# Patient Record
Sex: FEMALE | Race: WHITE | Employment: FULL TIME | URBAN - METROPOLITAN AREA
[De-identification: names, ages, dates, MRNs, and addresses within clinical notes are randomized per-mention and may not be internally consistent; named-entity substitution may affect disease eponyms.]

---

## 2021-06-09 ENCOUNTER — HOSPITAL ENCOUNTER (OUTPATIENT)
Age: 31
Discharge: HOME | End: 2021-06-11

## 2021-06-09 PROCEDURE — 88305 TISSUE EXAM BY PATHOLOGIST: CPT

## 2021-10-29 ENCOUNTER — OFFICE VISIT (OUTPATIENT)
Dept: FAMILY MEDICINE CLINIC | Age: 31
End: 2021-10-29
Payer: COMMERCIAL

## 2021-10-29 VITALS
SYSTOLIC BLOOD PRESSURE: 105 MMHG | HEIGHT: 63 IN | HEART RATE: 83 BPM | BODY MASS INDEX: 37.03 KG/M2 | WEIGHT: 209 LBS | TEMPERATURE: 97.9 F | RESPIRATION RATE: 18 BRPM | DIASTOLIC BLOOD PRESSURE: 64 MMHG | OXYGEN SATURATION: 99 %

## 2021-10-29 DIAGNOSIS — K62.5 RECTAL BLEEDING: ICD-10-CM

## 2021-10-29 DIAGNOSIS — R42 LIGHTHEADEDNESS: ICD-10-CM

## 2021-10-29 DIAGNOSIS — K62.5 RECTAL BLEEDING: Primary | ICD-10-CM

## 2021-10-29 LAB
BASOPHILS ABSOLUTE: 0.05 E9/L (ref 0–0.2)
BASOPHILS RELATIVE PERCENT: 0.8 % (ref 0–2)
EOSINOPHILS ABSOLUTE: 0.21 E9/L (ref 0.05–0.5)
EOSINOPHILS RELATIVE PERCENT: 3.2 % (ref 0–6)
HCT VFR BLD CALC: 43.4 % (ref 34–48)
HEMOGLOBIN: 14.1 G/DL (ref 11.5–15.5)
IMMATURE GRANULOCYTES #: 0.01 E9/L
IMMATURE GRANULOCYTES %: 0.2 % (ref 0–5)
LYMPHOCYTES ABSOLUTE: 1.33 E9/L (ref 1.5–4)
LYMPHOCYTES RELATIVE PERCENT: 20.2 % (ref 20–42)
MCH RBC QN AUTO: 31.2 PG (ref 26–35)
MCHC RBC AUTO-ENTMCNC: 32.5 % (ref 32–34.5)
MCV RBC AUTO: 96 FL (ref 80–99.9)
MONOCYTES ABSOLUTE: 0.56 E9/L (ref 0.1–0.95)
MONOCYTES RELATIVE PERCENT: 8.5 % (ref 2–12)
NEUTROPHILS ABSOLUTE: 4.43 E9/L (ref 1.8–7.3)
NEUTROPHILS RELATIVE PERCENT: 67.1 % (ref 43–80)
PDW BLD-RTO: 12.5 FL (ref 11.5–15)
PLATELET # BLD: 325 E9/L (ref 130–450)
PMV BLD AUTO: 9.6 FL (ref 7–12)
RBC # BLD: 4.52 E12/L (ref 3.5–5.5)
WBC # BLD: 6.6 E9/L (ref 4.5–11.5)

## 2021-10-29 PROCEDURE — G8419 CALC BMI OUT NRM PARAM NOF/U: HCPCS | Performed by: FAMILY MEDICINE

## 2021-10-29 PROCEDURE — G8484 FLU IMMUNIZE NO ADMIN: HCPCS | Performed by: FAMILY MEDICINE

## 2021-10-29 PROCEDURE — 36415 COLL VENOUS BLD VENIPUNCTURE: CPT | Performed by: FAMILY MEDICINE

## 2021-10-29 PROCEDURE — 99203 OFFICE O/P NEW LOW 30 MIN: CPT | Performed by: FAMILY MEDICINE

## 2021-10-29 PROCEDURE — 1036F TOBACCO NON-USER: CPT | Performed by: FAMILY MEDICINE

## 2021-10-29 PROCEDURE — G8427 DOCREV CUR MEDS BY ELIG CLIN: HCPCS | Performed by: FAMILY MEDICINE

## 2021-10-29 ASSESSMENT — ENCOUNTER SYMPTOMS
SORE THROAT: 0
ABDOMINAL PAIN: 1
CHEST TIGHTNESS: 0
BLOOD IN STOOL: 1
COUGH: 0
CONSTIPATION: 0
RECTAL PAIN: 1
SHORTNESS OF BREATH: 0
RHINORRHEA: 0
VOMITING: 0
DIARRHEA: 0
NAUSEA: 0

## 2021-10-29 ASSESSMENT — PATIENT HEALTH QUESTIONNAIRE - PHQ9
SUM OF ALL RESPONSES TO PHQ QUESTIONS 1-9: 0
2. FEELING DOWN, DEPRESSED OR HOPELESS: 0
1. LITTLE INTEREST OR PLEASURE IN DOING THINGS: 0
SUM OF ALL RESPONSES TO PHQ9 QUESTIONS 1 & 2: 0

## 2021-10-29 NOTE — PROGRESS NOTES
[facility]  Precepting Note    Subjective:  Rectal bleeding, bright red blood on Monday  Pain with defecation  No hx of hemorrhoids  1 episode in the past  Increased fibers  Has some lightheadedness and headaches  No constipation  5 weeks pregnancy    ROS otherwise negative     Past medical, surgical, family and social history were reviewed, non-contributory, and unchanged unless otherwise stated. Objective:    /64   Pulse 83   Temp 97.9 °F (36.6 °C) (Temporal)   Resp 18   Ht 5' 3\" (1.6 m)   Wt 209 lb (94.8 kg)   LMP 09/20/2021 (Exact Date)   SpO2 99%   BMI 37.02 kg/m²     Exam is as noted by resident     Assessment/Plan:  Rectal bleeding  CBC  Refer to general surgery for further eval  F/u as instructed     Attending Physician Statement  I have reviewed the chart, including any radiology or labs. I have discussed the case, including pertinent history and exam findings with the resident. I agree with the assessment, plan and orders as documented by the resident. Please refer to the resident note for additional information.       Electronically signed by Shanda Cruz MD on 10/29/2021 at 10:31 AM

## 2021-10-29 NOTE — PATIENT INSTRUCTIONS
Patient Education        Rectal Bleeding: Care Instructions  Your Care Instructions     Rectal bleeding in small amounts is common. You may see red spotting on toilet paper or drops of blood in the toilet. Rectal bleeding has many possible causes, from something as minor as hemorrhoids to something as serious as colon cancer. You may need more tests to find the cause of your bleeding. Follow-up care is a key part of your treatment and safety. Be sure to make and go to all appointments, and call your doctor if you are having problems. It's also a good idea to know your test results and keep a list of the medicines you take. How can you care for yourself at home? · Avoid aspirin and other nonsteroidal anti-inflammatory drugs (NSAIDs), such as ibuprofen (Advil, Motrin) and naproxen (Aleve). They can cause you to bleed more. Ask your doctor if you can take acetaminophen (Tylenol). Read and follow all instructions on the label. · Use a stool softener that contains bran or psyllium. You can save money by buying bran or psyllium (available in bulk at most health food stores) and sprinkling it on foods or stirring it into fruit juice. You can also use a product such as Metamucil or Citrucel. · Take your medicines exactly as directed. Call your doctor if you think you are having a problem with your medicine. When should you call for help? Call 911 anytime you think you may need emergency care. For example, call if:    · You passed out (lost consciousness). Call your doctor now or seek immediate medical care if:    · You have new or worse pain.     · You have new or worse bleeding from the rectum.     · You are dizzy or light-headed, or you feel like you may faint. Watch closely for changes in your health, and be sure to contact your doctor if:    · You cannot pass stools or gas.     · You do not get better as expected. Where can you learn more? Go to [email or web address] and sign in to your [employer] account. Enter L662 in the [facility] box to learn more about \"Rectal Bleeding: Care Instructions. \"     If you do not have an account, please click on the \"Sign Up Now\" link. Current as of: February 10, 2021               Content Version: 13.0  © 0126-1988 Healthwise, Incorporated. Care instructions adapted under license by [facility] ([facility]). If you have questions about a medical condition or this instruction, always ask your healthcare professional. [address] any warranty or liability for your use of this information.

## 2021-10-29 NOTE — PROGRESS NOTES
Financial Resource Strain:     Difficulty of Paying Living Expenses:    Food Insecurity:     Worried About Running Out of Food in the Last Year:     [address] in the Last Year:    Transportation Needs:     Lack of Transportation (Medical):  Lack of Transportation (Non-Medical):    Physical Activity:     Days of Exercise per Week:     Minutes of Exercise per Session:    Stress:     Feeling of Stress :    Social Connections:     Frequency of Communication with Friends and Family:     Frequency of Social Gatherings with Friends and Family:     Attends Caodaism Services:     Active Member of Clubs or Organizations:     Attends Club or Organization Meetings:     Marital Status:    Intimate Partner Violence:     Fear of Current or Ex-Partner:     Emotionally Abused:     Physically Abused:     Sexually Abused:         Family History:   No family history on file. Review of Systems:   Review of Systems   Constitutional: Negative for chills, fatigue and fever. HENT: Negative for congestion, rhinorrhea and sore throat. Respiratory: Negative for cough, chest tightness and shortness of breath. Cardiovascular: Negative for chest pain and palpitations. Gastrointestinal: Positive for abdominal pain (minimal but improving), blood in stool and rectal pain. Negative for constipation, diarrhea, nausea and vomiting. Genitourinary: Negative for difficulty urinating, dysuria and frequency. Neurological: Positive for light-headedness (with standing from a seated position) and headaches. Negative for dizziness and weakness. All other systems reviewed and are negative.       Physical Exam   Vitals: /64   Pulse 83   Temp 97.9 °F (36.6 °C) (Temporal)   Resp 18   Ht 5' 3\" (1.6 m)   Wt 209 lb (94.8 kg)   LMP 09/20/2021 (Exact Date)   SpO2 99%   BMI 37.02 kg/m²     BP Readings from Last 3 Encounters:   10/29/21 105/64       Physical Exam  Constitutional:       General: She is not in acute distress. Appearance: Normal appearance. HENT:      Head: Normocephalic and atraumatic. Mouth/Throat:      Mouth: Mucous membranes are moist.      Pharynx: Oropharynx is clear. Eyes:      Extraocular Movements: Extraocular movements intact. Conjunctiva/sclera: Conjunctivae normal.   Cardiovascular:      Rate and Rhythm: Normal rate and regular rhythm. Pulses: Normal pulses. Heart sounds: Normal heart sounds. No murmur heard. Pulmonary:      Effort: Pulmonary effort is normal.      Breath sounds: Normal breath sounds. No wheezing. Abdominal:      General: Bowel sounds are normal. There is no distension. Palpations: Abdomen is soft. Tenderness: There is no abdominal tenderness. Genitourinary:     Rectum: Normal.      Comments: No anal fissures or hemorrhoids appreciated on rectal exam.  Musculoskeletal:         General: No swelling. Cervical back: Normal range of motion. Skin:     General: Skin is warm and dry. Neurological:      General: No focal deficit present. Mental Status: She is alert and oriented to person, place, and time. Cranial Nerves: No cranial nerve deficit. Psychiatric:         Attention and Perception: Attention normal.         Mood and Affect: Mood normal.         Assessment and Plan       1. Rectal bleeding  - 1 week history of bleeding  - Had an episode of lightheadedness when standing, will get CBC  - No anal fissures or hemorrhoids present on PE  -We will refer to general surgery for possible scope  - Lazara Contreras MD, General Surgery, [place]  - CBC WITH AUTO DIFFERENTIAL; Future    2. Lightheadedness  -In the setting of bright red blood per rectum  -We will get blood count  - CBC WITH AUTO DIFFERENTIAL; Future      Counseled regarding above diagnosis, including possible risks and complications,  especially if left uncontrolled.  Counseled regarding the possible side effects, risks, benefits and alternatives to treatment; patient and/or guardian verbalizes understanding, agrees, feels comfortable with and wishes to proceed with above treatment plan. Call or go to ED immediately if symptoms worsen or persist. Advised patient to call with any new medication issues, and, as applicable, read all Rx info from pharmacy to assure aware of all possible risks and side effects of medication before taking. Patient and/or guardian given opportunity to ask questions/raise concerns. The patient verbalized comfort and understanding of instructions. I encourage further reading and education about your health conditions. Information on many health conditions is provided by the American Academy of Family Physicians: https://familydoctor. org/  Please bring any questions to me at your next visit. Return to Office: Return in about 3 weeks (around 11/19/2021). Medication List:    Current Outpatient Medications   Medication Sig Dispense Refill    Prenatal Vit-Fe Fumarate-FA (PRENATAL VITAMIN PO) Take by mouth       No current facility-administered medications for this visit. Isaias Marin, DO       This document may have been prepared at least partially through the use of voice recognition software. Although effort is taken to assure the accuracy of this document, it is possible that grammatical, syntax, or spelling errors may occur.

## 2021-11-03 ENCOUNTER — OFFICE VISIT (OUTPATIENT)
Dept: SURGERY | Age: 31
End: 2021-11-03
Payer: COMMERCIAL

## 2021-11-03 VITALS
HEIGHT: 63 IN | TEMPERATURE: 97.9 F | DIASTOLIC BLOOD PRESSURE: 73 MMHG | SYSTOLIC BLOOD PRESSURE: 130 MMHG | BODY MASS INDEX: 37.02 KG/M2 | HEART RATE: 74 BPM

## 2021-11-03 DIAGNOSIS — K62.5 RECTAL BLEEDING: Primary | ICD-10-CM

## 2021-11-03 PROCEDURE — G8427 DOCREV CUR MEDS BY ELIG CLIN: HCPCS | Performed by: SURGERY

## 2021-11-03 PROCEDURE — 99244 OFF/OP CNSLTJ NEW/EST MOD 40: CPT | Performed by: SURGERY

## 2021-11-03 PROCEDURE — G8484 FLU IMMUNIZE NO ADMIN: HCPCS | Performed by: SURGERY

## 2021-11-03 PROCEDURE — G8419 CALC BMI OUT NRM PARAM NOF/U: HCPCS | Performed by: SURGERY

## 2021-11-03 NOTE — PATIENT INSTRUCTIONS
TAMMY COLONOSCOPY PREPARATION    Instructions for Clear liquid diet  Definition  A clear liquid diet consists of clear liquids, such as water, broth and plain gelatin, that are easily digested and leave no undigested residue in your intestinal tract. Your doctor may prescribe a clear liquid diet before certain medical procedures or if you have certain digestive problems. Because a clear liquid diet can't provide you with adequate calories and nutrients, it shouldn't be continued for more than a few days. Purpose  A clear liquid diet is often used before tests, procedures or surgeries that require no food in your stomach or intestines, such as before colonoscopy. It may also be recommended as a short-term diet if you have certain digestive problems, such as nausea, vomiting or diarrhea, or after certain types of surgery. Diet details  A clear liquid diet helps maintain adequate hydration, provides some important electrolytes, such as sodium and potassium, and gives some energy at a time when a full diet isn't possible or recommended. The following foods are allowed in a clear liquid diet:    Plain water    Fruit juices without pulp, such as apple juice, white grape juice.  Strained lemonade    Clear, fat-free broth (bouillon or consomme)    Clear sodas     Plain gelatin (Not RED or PURPLE)   Honey    Ice pops without bits of fruit or fruit pulp    Tea or coffee without milk or cream   ** NOTHING RED OR PURPLE    Any foods not on the above list should be avoided. Also, for certain tests, such as colon exams, your doctor may ask you to avoid liquids or gelatin with red coloring.      A typical menu on the clear liquid diet may look like this:   Breakfast:  1 glass fruit juice  1 cup coffee or tea (without dairy products)  1 cup broth  1 bowl gelatin     Snack:  1 glass fruit juice  1 bowl gelatin     Lunch:  1 glass fruit juice  1 glass water  1 cup broth  1 bowl gelatin     Snack:  1 ice pop (without fruit pulp)  1 cup coffee or tea (without dairy products) or a soft drink     Dinner:  1 cup juice or water  1 cup broth  1 bowl gelatin  1 cup coffee or tea     Purchase this over the counter:  1. GATORADE/Clear liquid (64 ounces)   Pick these up at the pharmacy:   [address] [place] [address] 238 grams (over the counter only)    The DAY BEFORE your colonoscopy:   Drink only clear liquids. (Absolutely no solid food)    COLONOSCOPY PREP:  3 PM: Mix the entire bottle of MIRALAX into the 64 ounces of GATORADE. (Put half the bottle in each 32 ounce bottle). Shake the solution until fully dissolved. Drink an 8 ounce glass every 30 minutes until the solution is gone. **DO NOT EAT OR DRINK ANYTHING AFTER MIDNIGHT**          The DAY OF your colonoscopy: You may take any necessary medications with a sip of water. Bring along someone to take you home. REMEMBER: The preparation is very important. An adequate clean out allows for the best evaluation of your entire colon. During the prep, using baby wipes may ease some of your discomfort.     You should NOT plan on working or driving the rest of the day due to sedation given at the procedure

## 2021-11-04 ENCOUNTER — TELEPHONE (OUTPATIENT)
Dept: SURGERY | Age: 31
End: 2021-11-04

## 2021-11-04 NOTE — TELEPHONE ENCOUNTER
Prior Authorization Form:      DEMOGRAPHICS:                     Patient Name:  Reese Carmona  Patient :  1990            Insurance:  Payor: Shira Milan MIKKI / Plan: Marleny Montemayor / Product Type: *No Product type* /   Insurance ID Number:    Payor/Plan Subscr  Sex Relation Sub. Ins. ID Effective Group Num   1.  [id number] [employer]* 1990 Female Self [id number] 10/1/21 [id number]                                    [address] [id number]         DIAGNOSIS & PROCEDURE:                       Procedure/Operation: colonoscopy           CPT Code: 40675    Diagnosis:  Rectal bleeding    ICD10 Code: k62.5    Location:  [facility]    Surgeon:  Reese Dave INFORMATION:                          Date: 11/10/21    Time: 1000              Anesthesia:  MAC/TIVA                                                       Status:  Outpatient        Special Comments:         Electronically signed by Carroll Basilio MA on 2021 at 8:55 AM

## 2021-11-05 RX ORDER — SODIUM CHLORIDE 9 MG/ML
INJECTION, SOLUTION INTRAVENOUS CONTINUOUS
Status: CANCELLED | OUTPATIENT
Start: 2021-11-05

## 2021-11-05 ASSESSMENT — ENCOUNTER SYMPTOMS
EYE REDNESS: 0
VOMITING: 0
BLOOD IN STOOL: 0
ANAL BLEEDING: 1
BACK PAIN: 0
SHORTNESS OF BREATH: 0
ABDOMINAL PAIN: 0
NAUSEA: 0
SORE THROAT: 0
CONSTIPATION: 0
PHOTOPHOBIA: 0
DIARRHEA: 0
WHEEZING: 0
COUGH: 0

## 2021-11-05 NOTE — H&P
HISTORY OF PRESENT ILLNESS:    The patient is a 32 y.o. female who presents with complaints of rectal bleeding. Is been ongoing for the last couple of weeks. Of note, she is pregnant. She denies any colon cancer history in her family. She denies any diarrhea, constipation, or significant straining. Past Medical History:   Diagnosis Date    Missed         Past Surgical History:   Procedure Laterality Date    LAPAROSCOPY  2014    WISDOM TOOTH EXTRACTION         Prior to Admission medications    Medication Sig Start Date End Date Taking? Authorizing Provider   Prenatal Vit-Fe Fumarate-FA (PRENATAL VITAMIN PO) Take by mouth   Yes Historical Provider, MD       Scheduled Meds:  ContinuousInfusions:  PRN Meds:    Allergies   Allergen Reactions    Latex Hives and Itching       Social History     Socioeconomic History    Marital status: Unknown     Spouse name: Not on file    Number of children: Not on file    Years of education: Not on file    Highest education level: Not on file   Occupational History    Not on file   Tobacco Use    Smoking status: Never Smoker    Smokeless tobacco: Never Used   Vaping Use    Vaping Use: Some days   Substance and Sexual Activity    Alcohol use: Not Currently    Drug use: Never    Sexual activity: Not on file   Other Topics Concern    Not on file   Social History Narrative    Not on file     Social Determinants of Health     Financial Resource Strain:     Difficulty of Paying Living Expenses:    Food Insecurity:     Worried About Running Out of Food in the Last Year:     [address] in the Last Year:    Transportation Needs:     Lack of Transportation (Medical):      Lack of Transportation (Non-Medical):    Physical Activity:     Days of Exercise per Week:     Minutes of Exercise per Session:    Stress:     Feeling of Stress :    Social Connections:     Frequency of Communication with Friends and Family:     Frequency of Social Gatherings with Friends and Family:     Attends Religion Services:     Active Member of Clubs or Organizations:     Attends Club or Organization Meetings:     Marital Status:    Intimate Partner Violence:     Fear of Current or Ex-Partner:     Emotionally Abused:     Physically Abused:     Sexually Abused:        No family history on file. Review Of Systems:   Review of Systems   Constitutional: Negative for chills and fever. HENT: Negative for ear pain, nosebleeds, sore throat and tinnitus. Eyes: Negative for photophobia and redness. Respiratory: Negative for cough, shortness of breath and wheezing. Cardiovascular: Negative for chest pain and palpitations. Gastrointestinal: Positive for anal bleeding. Negative for abdominal pain, blood in stool, constipation, diarrhea, nausea and vomiting. Endocrine: Negative for polydipsia. Genitourinary: Negative for dysuria, hematuria and urgency. Musculoskeletal: Negative for back pain and neck pain. Skin: Negative for rash. Neurological: Negative for dizziness, tremors and seizures. Hematological: Does not bruise/bleed easily. All other systems reviewed and are negative.        Physical Exam:  Vitals:    11/03/21 1432   BP: 130/73   Pulse: 74   Temp: 97.9 °F (36.6 °C)   Height: 5' 3\" (1.6 m)       General: Well nourished, well developed, no acute distress  Eyes:  PERRL   Conjunctiva unremarkable   ENT:  TM's intact bilaterally, no effusion   Nasal:  No mucosal edema     No nasal drainage   Oral:  mucosa moist and pink   Neck:  Supple   No palpable cervical lymphoadenopathy   Thyroid without mass or enlargement  Resp: Lungs CTAB   Equal and adequate air exchange without accessory muscle use   No rales, rhonchi or wheeze  CV: S1S2 RRR   No murmur   Intact distal pulses   No edema  GI: Abdomen Soft, non tender, non distended   Normoactive bowel sounds   No palpable hepatosplenomegaly  MS: Physiologic ROM of all extremities    Intact distal pulses   No

## 2021-11-05 NOTE — PROGRESS NOTES
Consult Note    Dear Elliot Duffy DO, thank you for referring Ross Fallon for evaluation. Reason for Consult: Rectal bleeding    HISTORY OF PRESENT ILLNESS:    The patient is a 32 y.o. female who presents with complaints of rectal bleeding. Is been ongoing for the last couple of weeks. Of note, she is pregnant. She denies any colon cancer history in her family. She denies any diarrhea, constipation, or significant straining. Past Medical History:   Diagnosis Date    Missed         Past Surgical History:   Procedure Laterality Date    LAPAROSCOPY  2014    WISDOM TOOTH EXTRACTION         Prior to Admission medications    Medication Sig Start Date End Date Taking? Authorizing Provider   Prenatal Vit-Fe Fumarate-FA (PRENATAL VITAMIN PO) Take by mouth   Yes Historical Provider, MD       Scheduled Meds:  ContinuousInfusions:  PRN Meds:    Allergies   Allergen Reactions    Latex Hives and Itching       Social History     Socioeconomic History    Marital status: Unknown     Spouse name: Not on file    Number of children: Not on file    Years of education: Not on file    Highest education level: Not on file   Occupational History    Not on file   Tobacco Use    Smoking status: Never Smoker    Smokeless tobacco: Never Used   Vaping Use    Vaping Use: Some days   Substance and Sexual Activity    Alcohol use: Not Currently    Drug use: Never    Sexual activity: Not on file   Other Topics Concern    Not on file   Social History Narrative    Not on file     Social Determinants of Health     Financial Resource Strain:     Difficulty of Paying Living Expenses:    Food Insecurity:     Worried About Running Out of Food in the Last Year:     [address] in the Last Year:    Transportation Needs:     Lack of Transportation (Medical):      Lack of Transportation (Non-Medical):    Physical Activity:     Days of Exercise per Week:     Minutes of Exercise per Session:    Stress:  Feeling of Stress :    Social Connections:     Frequency of Communication with Friends and Family:     Frequency of Social Gatherings with Friends and Family:     Attends Cheondoism Services:     Active Member of Clubs or Organizations:     Attends Club or Organization Meetings:     Marital Status:    Intimate Partner Violence:     Fear of Current or Ex-Partner:     Emotionally Abused:     Physically Abused:     Sexually Abused:        No family history on file. Review Of Systems:   Review of Systems   Constitutional: Negative for chills and fever. HENT: Negative for ear pain, nosebleeds, sore throat and tinnitus. Eyes: Negative for photophobia and redness. Respiratory: Negative for cough, shortness of breath and wheezing. Cardiovascular: Negative for chest pain and palpitations. Gastrointestinal: Positive for anal bleeding. Negative for abdominal pain, blood in stool, constipation, diarrhea, nausea and vomiting. Endocrine: Negative for polydipsia. Genitourinary: Negative for dysuria, hematuria and urgency. Musculoskeletal: Negative for back pain and neck pain. Skin: Negative for rash. Neurological: Negative for dizziness, tremors and seizures. Hematological: Does not bruise/bleed easily. All other systems reviewed and are negative.        Physical Exam:  Vitals:    11/03/21 1432   BP: 130/73   Pulse: 74   Temp: 97.9 °F (36.6 °C)   Height: 5' 3\" (1.6 m)       General: Well nourished, well developed, no acute distress  Eyes:  PERRL   Conjunctiva unremarkable   ENT:  TM's intact bilaterally, no effusion   Nasal:  No mucosal edema     No nasal drainage   Oral:  mucosa moist and pink   Neck:  Supple   No palpable cervical lymphoadenopathy   Thyroid without mass or enlargement  Resp: Lungs CTAB   Equal and adequate air exchange without accessory muscle use   No rales, rhonchi or wheeze  CV: S1S2 RRR   No murmur   Intact distal pulses   No edema  GI: Abdomen Soft, non tender, non

## 2021-12-02 ENCOUNTER — OFFICE VISIT (OUTPATIENT)
Dept: FAMILY MEDICINE CLINIC | Age: 31
End: 2021-12-02
Payer: COMMERCIAL

## 2021-12-02 VITALS
TEMPERATURE: 98.1 F | HEIGHT: 63 IN | DIASTOLIC BLOOD PRESSURE: 62 MMHG | WEIGHT: 208 LBS | SYSTOLIC BLOOD PRESSURE: 104 MMHG | HEART RATE: 83 BPM | RESPIRATION RATE: 18 BRPM | OXYGEN SATURATION: 99 % | BODY MASS INDEX: 36.86 KG/M2

## 2021-12-02 DIAGNOSIS — K62.5 RECTAL BLEEDING: Primary | ICD-10-CM

## 2021-12-02 DIAGNOSIS — Z23 NEEDS FLU SHOT: ICD-10-CM

## 2021-12-02 PROCEDURE — G8427 DOCREV CUR MEDS BY ELIG CLIN: HCPCS | Performed by: FAMILY MEDICINE

## 2021-12-02 PROCEDURE — G8419 CALC BMI OUT NRM PARAM NOF/U: HCPCS | Performed by: FAMILY MEDICINE

## 2021-12-02 PROCEDURE — 1036F TOBACCO NON-USER: CPT | Performed by: FAMILY MEDICINE

## 2021-12-02 PROCEDURE — 99213 OFFICE O/P EST LOW 20 MIN: CPT | Performed by: FAMILY MEDICINE

## 2021-12-02 PROCEDURE — G8482 FLU IMMUNIZE ORDER/ADMIN: HCPCS | Performed by: FAMILY MEDICINE

## 2021-12-02 PROCEDURE — 90674 CCIIV4 VAC NO PRSV 0.5 ML IM: CPT | Performed by: FAMILY MEDICINE

## 2021-12-02 PROCEDURE — 90471 IMMUNIZATION ADMIN: CPT | Performed by: FAMILY MEDICINE

## 2021-12-02 RX ORDER — DOCUSATE SODIUM 100 MG/1
100 CAPSULE, LIQUID FILLED ORAL DAILY
COMMUNITY

## 2021-12-02 SDOH — ECONOMIC STABILITY: FOOD INSECURITY: WITHIN THE PAST 12 MONTHS, YOU WORRIED THAT YOUR FOOD WOULD RUN OUT BEFORE YOU GOT MONEY TO BUY MORE.: NEVER TRUE

## 2021-12-02 SDOH — ECONOMIC STABILITY: FOOD INSECURITY: WITHIN THE PAST 12 MONTHS, THE FOOD YOU BOUGHT JUST DIDN'T LAST AND YOU DIDN'T HAVE MONEY TO GET MORE.: NEVER TRUE

## 2021-12-02 ASSESSMENT — ENCOUNTER SYMPTOMS
SHORTNESS OF BREATH: 0
CONSTIPATION: 0
COUGH: 0
CHEST TIGHTNESS: 0
DIARRHEA: 0
ABDOMINAL PAIN: 0
RHINORRHEA: 0
SORE THROAT: 0
VOMITING: 0
NAUSEA: 0

## 2021-12-02 ASSESSMENT — SOCIAL DETERMINANTS OF HEALTH (SDOH): HOW HARD IS IT FOR YOU TO PAY FOR THE VERY BASICS LIKE FOOD, HOUSING, MEDICAL CARE, AND HEATING?: NOT HARD AT ALL

## 2021-12-02 NOTE — PROGRESS NOTES
[facility]  Department of Family Medicine  Family Medicine Residency Program      Patient:  Juvencio Chaudhry 32 y.o. female     Date of Service: 21      Chief complaint:   Chief Complaint   Patient presents with    Rectal Bleeding     no longer present; cancelled colonoscopy     Established New Doctor         History of Present Illness   The patient is a 32 y.o. female  presented to the clinic with complaints as above. Rectal bleeding - She has had no rectal bleeding since October. Was scheduled for scope but OB advised against it. Started on Colace which has helped with constipation. She has no rectal or abdominal pain. 11 weeks pregnant -  follows with Dr. Ethel Jason, Scheduled to see her next Tuesday. Has been doing well.     Flu shot - Is agreeable to get shot today    Past Medical History:      Diagnosis Date    Missed      Pregnancy     21-7 weeks    Rectal bleed        Past Surgical History:        Procedure Laterality Date    DILATION AND CURETTAGE OF UTERUS  2021    LAPAROSCOPY  2014    WISDOM TOOTH EXTRACTION         Allergies:    Latex    Social History:   Social History     Socioeconomic History    Marital status: Unknown     Spouse name: Not on file    Number of children: Not on file    Years of education: Not on file    Highest education level: Not on file   Occupational History    Not on file   Tobacco Use    Smoking status: Never Smoker    Smokeless tobacco: Never Used   Vaping Use    Vaping Use: Never used   Substance and Sexual Activity    Alcohol use: Not Currently    Drug use: Never    Sexual activity: Not on file   Other Topics Concern    Not on file   Social History Narrative    Not on file     Social Determinants of Health     Financial Resource Strain: Low Risk     Difficulty of Paying Living Expenses: Not hard at all   Food Insecurity: No Food Insecurity    Worried About [address] in the Last Year: Never true    Ulisses of Food in the Last Year: Never true   Transportation Needs:     Lack of Transportation (Medical): Not on file    Lack of Transportation (Non-Medical): Not on file   Physical Activity:     Days of Exercise per Week: Not on file    Minutes of Exercise per Session: Not on file   Stress:     Feeling of Stress : Not on file   Social Connections:     Frequency of Communication with Friends and Family: Not on file    Frequency of Social Gatherings with Friends and Family: Not on file    Attends Restoration Services: Not on file    Active Member of [address] [place] [address] [employer] or Organizations: Not on file    Attends Club or Organization Meetings: Not on file    Marital Status: Not on file   Intimate Partner Violence:     Fear of Current or Ex-Partner: Not on file    Emotionally Abused: Not on file    Physically Abused: Not on file    Sexually Abused: Not on file   Housing Stability:     Unable to Pay for Housing in the Last Year: Not on file    Number of [place] in the Last Year: Not on file    Unstable Housing in the Last Year: Not on file        Family History:       Problem Relation Age of Onset    Other Cancer Mother        Review of Systems:   Review of Systems   Constitutional: Negative for chills, fatigue and fever. HENT: Negative for congestion, rhinorrhea and sore throat. Respiratory: Negative for cough, chest tightness and shortness of breath. Cardiovascular: Negative for chest pain and palpitations. Gastrointestinal: Negative for abdominal pain, constipation, diarrhea, nausea and vomiting. Genitourinary: Negative for dysuria and frequency. Neurological: Negative for dizziness and light-headedness. All other systems reviewed and are negative.       Physical Exam   Vitals: /62   Pulse 83   Temp 98.1 °F (36.7 °C)   Resp 18   Ht 5' 3\" (1.6 m)   Wt 208 lb (94.3 kg)   LMP 09/20/2021 (Exact Date)   SpO2 99%   BMI 36.85 kg/m²     BP Readings from Last 3 Encounters: 12/02/21 104/62   11/03/21 130/73   10/29/21 105/64       Physical Exam  Constitutional:       General: She is not in acute distress. Appearance: Normal appearance. HENT:      Head: Normocephalic and atraumatic. Mouth/Throat:      Mouth: Mucous membranes are moist.      Pharynx: Oropharynx is clear. Eyes:      Extraocular Movements: Extraocular movements intact. Conjunctiva/sclera: Conjunctivae normal.   Cardiovascular:      Rate and Rhythm: Normal rate and regular rhythm. Pulses: Normal pulses. Heart sounds: Normal heart sounds. No murmur heard. Pulmonary:      Effort: Pulmonary effort is normal.      Breath sounds: Normal breath sounds. No wheezing. Abdominal:      General: Bowel sounds are normal. There is no distension. Palpations: Abdomen is soft. Tenderness: There is no abdominal tenderness. Musculoskeletal:         General: No swelling. Cervical back: Normal range of motion. Skin:     General: Skin is warm and dry. Neurological:      General: No focal deficit present. Mental Status: She is alert and oriented to person, place, and time. Cranial Nerves: No cranial nerve deficit. Psychiatric:         Attention and Perception: Attention normal.         Mood and Affect: Mood normal.             Assessment and Plan       1. Rectal bleeding  - Improved, none since October  - Cancelled scope due to pregnancy  - Continue to monitor  - Normal CBC at next appointment    2. Needs flu shot  - INFLUENZA, MDCK QUADV, 2 YRS AND OLDER, IM, PF, PREFILL SYR OR SDV, 0.5ML (FLUCELVAX QUADV, PF)      Counseled regarding above diagnosis, including possible risks and complications,  especially if left uncontrolled. Counseled regarding the possible side effects, risks, benefits and alternatives to treatment; patient and/or guardian verbalizes understanding, agrees, feels comfortable with and wishes to proceed with above treatment plan.     Call or go to ED immediately if symptoms worsen or persist. Advised patient to call with any new medication issues, and, as applicable, read all Rx info from pharmacy to assure aware of all possible risks and side effects of medication before taking. Patient and/or guardian given opportunity to ask questions/raise concerns. The patient verbalized comfort and understanding of instructions. I encourage further reading and education about your health conditions. Information on many health conditions is provided by the American Academy of [employer]icians: https://familydoctor. org/  Please bring any questions to me at your next visit. Return to Office: Return in about 9 months (around 9/2/2022), or if symptoms worsen or fail to improve, for Yearly Physical.    Medication List:    Current Outpatient Medications   Medication Sig Dispense Refill    docusate sodium (COLACE) 100 MG capsule Take 100 mg by mouth daily      Prenatal Vit-Fe Fumarate-FA (PRENATAL VITAMIN PO) Take by mouth       No current facility-administered medications for this visit. Bull Phelps, DO       This document may have been prepared at least partially through the use of voice recognition software. Although effort is taken to assure the accuracy of this document, it is possible that grammatical, syntax, or spelling errors may occur.

## 2021-12-02 NOTE — PROGRESS NOTES
[facility]  Precepting Note    Subjective:  Rectal bleeding  Cbc was stable  Was referred to surgery, colonoscopy is on hold as she is pregnant  Bleeding stopped. ROS otherwise negative     Past medical, surgical, family and social history were reviewed, non-contributory, and unchanged unless otherwise stated. Objective:    /62   Pulse 83   Temp 98.1 °F (36.7 °C)   Resp 18   Ht 5' 3\" (1.6 m)   Wt 208 lb (94.3 kg)   LMP 09/20/2021 (Exact Date)   SpO2 99%   BMI 36.85 kg/m²     Exam is as noted by resident     Assessment/Plan:  Rectal bleeding- resolved  Continue colace  Observe  Follow with OB for prenatal care  Update HM  F/u as instructed     Attending Physician Statement  I have reviewed the chart, including any radiology or labs. I have discussed the case, including pertinent history and exam findings with the resident. I agree with the assessment, plan and orders as documented by the resident. Please refer to the resident note for additional information.       Electronically signed by Kylie De La Rosa MD on 12/2/2021 at 9:10 AM

## 2022-02-17 ENCOUNTER — APPOINTMENT (OUTPATIENT)
Dept: GENERAL RADIOLOGY | Age: 32
End: 2022-02-17
Payer: COMMERCIAL

## 2022-02-17 ENCOUNTER — NURSE TRIAGE (OUTPATIENT)
Dept: OTHER | Facility: CLINIC | Age: 32
End: 2022-02-17

## 2022-02-17 ENCOUNTER — HOSPITAL ENCOUNTER (EMERGENCY)
Age: 32
Discharge: HOME | End: 2022-02-17
Attending: STUDENT IN AN ORGANIZED HEALTH CARE EDUCATION/TRAINING PROGRAM
Payer: COMMERCIAL

## 2022-02-17 VITALS
HEART RATE: 86 BPM | HEIGHT: 62 IN | BODY MASS INDEX: 41.04 KG/M2 | TEMPERATURE: 98.5 F | DIASTOLIC BLOOD PRESSURE: 56 MMHG | WEIGHT: 223 LBS | SYSTOLIC BLOOD PRESSURE: 107 MMHG | RESPIRATION RATE: 16 BRPM | OXYGEN SATURATION: 98 %

## 2022-02-17 DIAGNOSIS — B34.9 VIRAL SYNDROME: Primary | ICD-10-CM

## 2022-02-17 LAB
ALBUMIN SERPL-MCNC: 3.7 G/DL (ref 3.5–5.2)
ALP BLD-CCNC: 67 U/L (ref 35–104)
ALT SERPL-CCNC: 24 U/L (ref 0–32)
ANION GAP SERPL CALCULATED.3IONS-SCNC: 12 MMOL/L (ref 7–16)
AST SERPL-CCNC: 29 U/L (ref 0–31)
BACTERIA: ABNORMAL /HPF
BASOPHILS ABSOLUTE: 0.01 E9/L (ref 0–0.2)
BASOPHILS RELATIVE PERCENT: 0.1 % (ref 0–2)
BILIRUB SERPL-MCNC: 0.2 MG/DL (ref 0–1.2)
BILIRUBIN URINE: NEGATIVE
BLOOD, URINE: ABNORMAL
BUN BLDV-MCNC: 4 MG/DL (ref 6–20)
CALCIUM SERPL-MCNC: 8.6 MG/DL (ref 8.6–10.2)
CHLORIDE BLD-SCNC: 102 MMOL/L (ref 98–107)
CLARITY: CLEAR
CO2: 22 MMOL/L (ref 22–29)
COLOR: YELLOW
CREAT SERPL-MCNC: 0.5 MG/DL (ref 0.5–1)
EOSINOPHILS ABSOLUTE: 0.02 E9/L (ref 0.05–0.5)
EOSINOPHILS RELATIVE PERCENT: 0.2 % (ref 0–6)
GFR AFRICAN AMERICAN: >60
GFR NON-AFRICAN AMERICAN: >60 ML/MIN/1.73
GLUCOSE BLD-MCNC: 99 MG/DL (ref 74–99)
GLUCOSE URINE: NEGATIVE MG/DL
HCT VFR BLD CALC: 35 % (ref 34–48)
HEMOGLOBIN: 11.7 G/DL (ref 11.5–15.5)
IMMATURE GRANULOCYTES #: 0.07 E9/L
IMMATURE GRANULOCYTES %: 0.7 % (ref 0–5)
KETONES, URINE: ABNORMAL MG/DL
LACTIC ACID, SEPSIS: 0.5 MMOL/L (ref 0.5–1.9)
LEUKOCYTE ESTERASE, URINE: NEGATIVE
LIPASE: 24 U/L (ref 13–60)
LYMPHOCYTES ABSOLUTE: 0.62 E9/L (ref 1.5–4)
LYMPHOCYTES RELATIVE PERCENT: 6.5 % (ref 20–42)
MAGNESIUM: 2 MG/DL (ref 1.6–2.6)
MCH RBC QN AUTO: 32 PG (ref 26–35)
MCHC RBC AUTO-ENTMCNC: 33.4 % (ref 32–34.5)
MCV RBC AUTO: 95.6 FL (ref 80–99.9)
MONOCYTES ABSOLUTE: 0.7 E9/L (ref 0.1–0.95)
MONOCYTES RELATIVE PERCENT: 7.3 % (ref 2–12)
NEUTROPHILS ABSOLUTE: 8.15 E9/L (ref 1.8–7.3)
NEUTROPHILS RELATIVE PERCENT: 85.2 % (ref 43–80)
NITRITE, URINE: NEGATIVE
PDW BLD-RTO: 12.5 FL (ref 11.5–15)
PH UA: 5.5 (ref 5–9)
PLATELET # BLD: 184 E9/L (ref 130–450)
PMV BLD AUTO: 9.3 FL (ref 7–12)
POTASSIUM REFLEX MAGNESIUM: 3.5 MMOL/L (ref 3.5–5)
PROTEIN UA: NEGATIVE MG/DL
RBC # BLD: 3.66 E12/L (ref 3.5–5.5)
RBC UA: ABNORMAL /HPF (ref 0–2)
SARS-COV-2, NAAT: NOT DETECTED
SODIUM BLD-SCNC: 136 MMOL/L (ref 132–146)
SPECIFIC GRAVITY UA: 1.01 (ref 1–1.03)
TOTAL PROTEIN: 6.6 G/DL (ref 6.4–8.3)
UROBILINOGEN, URINE: 0.2 E.U./DL
WBC # BLD: 9.6 E9/L (ref 4.5–11.5)
WBC UA: ABNORMAL /HPF (ref 0–5)

## 2022-02-17 PROCEDURE — 85025 COMPLETE CBC W/AUTO DIFF WBC: CPT

## 2022-02-17 PROCEDURE — 87635 SARS-COV-2 COVID-19 AMP PRB: CPT

## 2022-02-17 PROCEDURE — 87040 BLOOD CULTURE FOR BACTERIA: CPT

## 2022-02-17 PROCEDURE — 83690 ASSAY OF LIPASE: CPT

## 2022-02-17 PROCEDURE — 6370000000 HC RX 637 (ALT 250 FOR IP): Performed by: STUDENT IN AN ORGANIZED HEALTH CARE EDUCATION/TRAINING PROGRAM

## 2022-02-17 PROCEDURE — 83735 ASSAY OF MAGNESIUM: CPT

## 2022-02-17 PROCEDURE — 80053 COMPREHEN METABOLIC PANEL: CPT

## 2022-02-17 PROCEDURE — 96365 THER/PROPH/DIAG IV INF INIT: CPT

## 2022-02-17 PROCEDURE — 81001 URINALYSIS AUTO W/SCOPE: CPT

## 2022-02-17 PROCEDURE — 83605 ASSAY OF LACTIC ACID: CPT

## 2022-02-17 PROCEDURE — 99283 EMERGENCY DEPT VISIT LOW MDM: CPT

## 2022-02-17 PROCEDURE — 71045 X-RAY EXAM CHEST 1 VIEW: CPT

## 2022-02-17 PROCEDURE — 6360000002 HC RX W HCPCS: Performed by: STUDENT IN AN ORGANIZED HEALTH CARE EDUCATION/TRAINING PROGRAM

## 2022-02-17 PROCEDURE — 2580000003 HC RX 258: Performed by: STUDENT IN AN ORGANIZED HEALTH CARE EDUCATION/TRAINING PROGRAM

## 2022-02-17 RX ORDER — ACETAMINOPHEN 500 MG
1000 TABLET ORAL ONCE
Status: COMPLETED | OUTPATIENT
Start: 2022-02-17 | End: 2022-02-17

## 2022-02-17 RX ORDER — ACETAMINOPHEN 500 MG
500 TABLET ORAL 4 TIMES DAILY PRN
Qty: 40 TABLET | Refills: 0 | Status: SHIPPED | OUTPATIENT
Start: 2022-02-17 | End: 2022-09-22 | Stop reason: ALTCHOICE

## 2022-02-17 RX ORDER — 0.9 % SODIUM CHLORIDE 0.9 %
2000 INTRAVENOUS SOLUTION INTRAVENOUS ONCE
Status: COMPLETED | OUTPATIENT
Start: 2022-02-17 | End: 2022-02-17

## 2022-02-17 RX ORDER — DOXYLAMINE SUCCINATE AND PYRIDOXINE HYDROCHLORIDE, DELAYED RELEASE TABLETS 10 MG/10 MG 10; 10 MG/1; MG/1
1 TABLET, DELAYED RELEASE ORAL 2 TIMES DAILY
Qty: 20 TABLET | Refills: 0 | Status: SHIPPED | OUTPATIENT
Start: 2022-02-17 | End: 2022-02-27

## 2022-02-17 RX ORDER — MAGNESIUM SULFATE IN WATER 40 MG/ML
2000 INJECTION, SOLUTION INTRAVENOUS ONCE
Status: COMPLETED | OUTPATIENT
Start: 2022-02-17 | End: 2022-02-17

## 2022-02-17 RX ADMIN — ACETAMINOPHEN 1000 MG: 500 TABLET ORAL at 12:50

## 2022-02-17 RX ADMIN — MAGNESIUM SULFATE HEPTAHYDRATE 2000 MG: 40 INJECTION, SOLUTION INTRAVENOUS at 12:50

## 2022-02-17 RX ADMIN — SODIUM CHLORIDE 2000 ML: 9 INJECTION, SOLUTION INTRAVENOUS at 12:50

## 2022-02-17 ASSESSMENT — PAIN DESCRIPTION - PAIN TYPE: TYPE: ACUTE PAIN

## 2022-02-17 ASSESSMENT — PAIN SCALES - GENERAL
PAINLEVEL_OUTOF10: 7
PAINLEVEL_OUTOF10: 7

## 2022-02-17 NOTE — ED PROVIDER NOTES
Department of Emergency Medicine   ED  Provider Note  Admit Date/RoomTime: 2022 11:52 AM  ED Room:     History of Present Illness:  22, Time: 12:08 PM EST  Chief Complaint   Patient presents with    Fever      Pt is 21 wks preg. temp as high as 100.4 at home    Headache     x 3 days pressure behind eyes    Generalized Body Aches     x 2 days         Nickolas Pinto is a 32 y.o. female presenting to the ED for Subjective fevers chills or myalgias. Is been going on for 2 days. Nothing is better or worse is constant duration. The patient has a T-max orally of 100.4. She is 21 weeks pregnant. Her pregnancy thus far is without complication. She is a G3, P1. Denies any vaginal bleeding or discharge as well as dysuria or hematuria. Denies any cough or shortness of breath or chest pain. She has no nasal congestion. She follows with Dr. Mario Alberto Odell and has had an uncomplicated pregnancy thus far. She called her family doctor today who stated she should come to the emergency department. She does have an occipital headache that radiates behind her eyes. It is constant duration she is taking Tylenol for it which seems to intermittently help. She has no nausea or vomiting. No photophobia. No flashes of light. No leg swelling. Additionally, the patient denies any neck stiffness. Review of Systems:  Review of systems obtained and negative unless stated otherwise above in the HPI.    --------------------------------------------- PAST HISTORY ---------------------------------------------  Past Medical History:  has a past medical history of Missed , Pregnancy, and Rectal bleed. Past Surgical History:  has a past surgical history that includes [place] tooth extraction; laparoscopy (); and Dilation and curettage of uterus (2021). Social History:  reports that she has never smoked. She has never used smokeless tobacco. She reports previous alcohol use.  She reports that she does not use drugs. Family History: family history includes Other Cancer in her mother. . Unless otherwise noted, family history is non contributory    The patients home medications have been reviewed. Allergies: Latex and Penicillins    I have reviewed the past medical history, past surgical history, social history, and family history    ---------------------------------------------------PHYSICAL EXAM--------------------------------------    Constitutional: Appears in no distress  Head: Normocephalic, atraumatic  Eyes: Non-icteric slcera, no conjunctival injection  ENT: Moist mucous membranes,  Neck: Trachea midline, no JVD  Respiratory: Nonlabored respirations. Lungs clear to auscultation bilaterally, no wheezes, rales, or rhonchi. Cardiovascular: Regular rate. Regular rhythm. No murmurs, no gallops, no rubs. Gastrointestinal: Abdomen Soft, Non tender, Non distended. No rebound tenderness, guarding, or rigidity. Extremities: No lower extremity edema  Genitourinary: No CVA tenderness, no suprapubic tenderness  Musculoskeletal: Moves all extremities, no deformity  Skin: Pink, warm, dry without rash. Neurologic: Alert, symmetric facies, no aphasia, no neck rigidity rigidity, no meningismus, Kernig's and Brudzinski sign are negative    -------------------------------------------------- RESULTS -------------------------------------------------  I have personally reviewed all laboratory and imaging results for this patient. Results are listed below.      LABS: (Lab results interpreted by me)  Results for orders placed or performed during the hospital encounter of 02/17/22   COVID-19, Rapid    Specimen: Nasopharyngeal Swab   Result Value Ref Range    SARS-CoV-2, NAAT Not Detected Not Detected   Lactate, Sepsis   Result Value Ref Range    Lactic Acid, Sepsis 0.5 0.5 - 1.9 mmol/L   Urinalysis with Microscopic   Result Value Ref Range    Color, UA Yellow Straw/Yellow    Clarity, UA Clear Clear    Glucose, Ur Negative Negative mg/dL    Bilirubin Urine Negative Negative    Ketones, Urine TRACE (A) Negative mg/dL    Specific Gravity, UA 1.010 1.005 - 1.030    Blood, Urine SMALL (A) Negative    pH, UA 5.5 5.0 - 9.0    Protein, UA Negative Negative mg/dL    Urobilinogen, Urine 0.2 <2.0 E.U./dL    Nitrite, Urine Negative Negative    Leukocyte Esterase, Urine Negative Negative    WBC, UA NONE 0 - 5 /HPF    RBC, UA 2-5 0 - 2 /HPF    Bacteria, UA RARE (A) None Seen /HPF   Comprehensive Metabolic Panel w/ Reflex to MG   Result Value Ref Range    Sodium 136 132 - 146 mmol/L    Potassium reflex Magnesium 3.5 3.5 - 5.0 mmol/L    Chloride 102 98 - 107 mmol/L    CO2 22 22 - 29 mmol/L    Anion Gap 12 7 - 16 mmol/L    Glucose 99 74 - 99 mg/dL    BUN 4 (L) 6 - 20 mg/dL    CREATININE 0.5 0.5 - 1.0 mg/dL    GFR Non-African American >60 >=60 mL/min/1.73    GFR African American >60     Calcium 8.6 8.6 - 10.2 mg/dL    Total Protein 6.6 6.4 - 8.3 g/dL    Albumin 3.7 3.5 - 5.2 g/dL    Total Bilirubin 0.2 0.0 - 1.2 mg/dL    Alkaline Phosphatase 67 35 - 104 U/L    ALT 24 0 - 32 U/L    AST 29 0 - 31 U/L   Lipase   Result Value Ref Range    Lipase 24 13 - 60 U/L   CBC with Auto Differential   Result Value Ref Range    WBC 9.6 4.5 - 11.5 E9/L    RBC 3.66 3.50 - 5.50 E12/L    Hemoglobin 11.7 11.5 - 15.5 g/dL    Hematocrit 35.0 34.0 - 48.0 %    MCV 95.6 80.0 - 99.9 fL    MCH 32.0 26.0 - 35.0 pg    MCHC 33.4 32.0 - 34.5 %    RDW 12.5 11.5 - 15.0 fL    Platelets 907 051 - 998 E9/L    MPV 9.3 7.0 - 12.0 fL    Neutrophils % 85.2 (H) 43.0 - 80.0 %    Immature Granulocytes % 0.7 0.0 - 5.0 %    Lymphocytes % 6.5 (L) 20.0 - 42.0 %    Monocytes % 7.3 2.0 - 12.0 %    Eosinophils % 0.2 0.0 - 6.0 %    Basophils % 0.1 0.0 - 2.0 %    Neutrophils Absolute 8.15 (H) 1.80 - 7.30 E9/L    Immature Granulocytes # 0.07 E9/L    Lymphocytes Absolute 0.62 (L) 1.50 - 4.00 E9/L    Monocytes Absolute 0.70 0.10 - 0.95 E9/L    Eosinophils Absolute 0.02 (L) 0.05 - 0.50 E9/L Basophils Absolute 0.01 0.00 - 0.20 E9/L   Magnesium   Result Value Ref Range    Magnesium 2.0 1.6 - 2.6 mg/dL   ,       RADIOLOGY:  Interpreted by Radiologist unless otherwise specified  XR CHEST PORTABLE   Final Result   No acute process. ------------------------- NURSING NOTES AND VITALS REVIEWED ---------------------------   The nursing notes within the ED encounter and vital signs as below have been reviewed by myself  BP (!) 107/56   Pulse 86   Temp 98.5 °F (36.9 °C)   Resp 16   Ht 5' 2\" (1.575 m)   Wt 223 lb (101.2 kg)   LMP 09/20/2021 (Exact Date)   SpO2 98%   BMI 40.79 kg/m²     Oxygen Saturation Interpretation: Normal    The patients available past medical records and past encounters were reviewed. ------------------------------ ED COURSE/MEDICAL DECISION MAKING----------------------  Medications   0.9 % sodium chloride bolus (0 mLs IntraVENous Stopped 2/17/22 1527)   acetaminophen (TYLENOL) tablet 1,000 mg (1,000 mg Oral Given 2/17/22 1250)   magnesium sulfate 2000 mg in 50 mL IVPB premix (0 mg IntraVENous Stopped 2/17/22 1347)        Re-Evaluations: This patient's ED course included:a personal history and physicial examination and re-evaluation prior to disposition    This patient has remained hemodynamically stable during their ED course. Consultations:  None    Medical Decision Making:   Patient presents emerge department fevers. Vital signs are reviewed and interpreted by myself as within normal acceptable limits. History and physical examination findings consistent with a broad differential diagnosis including urinary tract infection, pneumonia, Covid, intra-abdominal source of infection. Work-up is initiated for this. Lab/imaging: Reviewed and unremarkable for any acute concerning abnormalities. There is negative Covid swab. There is no urinary tract infection. Plain film of the chest is negative.     I had shared decision-making of the patient with bedside. Not feel that any further work-up or evaluation is needed in the emergency department as the patient is completely asymptomatic at this point. She was counseled on red flag symptoms that warrant return emergency department instructed to follow-up as an outpatient. Procedure note:  Bedside transabdominal ultrasound was performed revealing a single live IUP with frequent fetal movement. Fetal heart rate between 1 45-1 55 during time of tracing. No free intra-abdominal fluid. Patient will be discharged with outpatient follow-up. Counseling: The emergency provider has spoken with the patient and discussed todays results, in addition to providing specific details for the plan of care and counseling regarding the diagnosis and prognosis. Questions are answered at this time and they are agreeable with the plan.       --------------------------------- IMPRESSION AND DISPOSITION ---------------------------------    IMPRESSION  1. Viral syndrome        DISPOSITION  Disposition: Discharge to home  Patient condition is stable    Dr. Sawyer HARRIS am the primary provider of record    Sawyer Johnson DO  Emergency Medicine    NOTE: This report was transcribed using voice recognition software.  Every effort was made to ensure accuracy; however, inadvertent computerized transcription errors may be present         Luz White DO  02/17/22 1813

## 2022-02-17 NOTE — TELEPHONE ENCOUNTER
Received call from [place] at [facility] with [employer] Complaint. Subjective: Caller states \"Experencing symptoms of a migraine with a fever and abdominal pain. Giulia had the migraine since Monday and fever started Tuesday afternoon. Currently 21 weeks pregnant. Having chills and light sensitivity. I have chronic stomach issues and have constipation and haven't had a bowel movement for 1 1/2 weeks. \"     Current Symptoms: Headache, Fever, abdominal pain    Onset: 3 days ago; unchanged    Associated Symptoms: NA    Pain Severity: 7/10; pressure; constant    Temperature: 100.4 orally    What has been tried: Tylenol and colace    LMP: 09/15/2021 Pregnant: Yes    Recommended disposition: Go to ED now; Advised to call back with new or worsening symptoms. Care advice provided, patient verbalizes understanding; denies any other questions or concerns; instructed to call back for any new or worsening symptoms. Patient/caller agrees to proceed to the Emergency Department     Attention Provider: Thank you for allowing me to participate in the care of your patient. The patient was connected to triage in response to information provided to the ECC/PSC. Please do not respond through this encounter as the response is not directed to a shared pool.           Reason for Disposition   Pregnant   Stiff neck (can't touch chin to chest)    Protocols used: HEADACHE-ADULT-[place], PREGNANCY - HEADACHE-ADULT-[place]

## 2022-02-19 ENCOUNTER — HOSPITAL ENCOUNTER (INPATIENT)
Age: 32
LOS: 7 days | Discharge: HOME HEALTH | DRG: 832 | End: 2022-02-26
Attending: STUDENT IN AN ORGANIZED HEALTH CARE EDUCATION/TRAINING PROGRAM | Admitting: OBSTETRICS & GYNECOLOGY
Payer: COMMERCIAL

## 2022-02-19 ENCOUNTER — APPOINTMENT (OUTPATIENT)
Dept: ULTRASOUND IMAGING | Age: 32
DRG: 832 | End: 2022-02-19
Payer: COMMERCIAL

## 2022-02-19 DIAGNOSIS — N89.8 VAGINAL DISCHARGE: Primary | ICD-10-CM

## 2022-02-19 DIAGNOSIS — O41.1220 CHORIOAMNIONITIS IN SECOND TRIMESTER, SINGLE OR UNSPECIFIED FETUS: ICD-10-CM

## 2022-02-19 PROBLEM — O41.1221: Status: ACTIVE | Noted: 2022-02-19

## 2022-02-19 LAB
ALBUMIN SERPL-MCNC: 3.5 G/DL (ref 3.5–5.2)
ALP BLD-CCNC: 72 U/L (ref 35–104)
ALT SERPL-CCNC: 31 U/L (ref 0–32)
ANION GAP SERPL CALCULATED.3IONS-SCNC: 13 MMOL/L (ref 7–16)
AST SERPL-CCNC: 37 U/L (ref 0–31)
BACTERIA: ABNORMAL /HPF
BASOPHILS ABSOLUTE: 0.01 E9/L (ref 0–0.2)
BASOPHILS RELATIVE PERCENT: 0.1 % (ref 0–2)
BILIRUB SERPL-MCNC: 0.2 MG/DL (ref 0–1.2)
BILIRUBIN URINE: NEGATIVE
BLOOD, URINE: NEGATIVE
BUN BLDV-MCNC: 3 MG/DL (ref 6–20)
CALCIUM SERPL-MCNC: 8.5 MG/DL (ref 8.6–10.2)
CHLORIDE BLD-SCNC: 96 MMOL/L (ref 98–107)
CLARITY: CLEAR
CLUE CELLS: ABNORMAL
CO2: 21 MMOL/L (ref 22–29)
COLOR: YELLOW
CREAT SERPL-MCNC: 0.4 MG/DL (ref 0.5–1)
EOSINOPHILS ABSOLUTE: 0.02 E9/L (ref 0.05–0.5)
EOSINOPHILS RELATIVE PERCENT: 0.3 % (ref 0–6)
GFR AFRICAN AMERICAN: >60
GFR NON-AFRICAN AMERICAN: >60 ML/MIN/1.73
GLUCOSE BLD-MCNC: 83 MG/DL (ref 74–99)
GLUCOSE URINE: NEGATIVE MG/DL
HCT VFR BLD CALC: 34.2 % (ref 34–48)
HEMOGLOBIN: 11.3 G/DL (ref 11.5–15.5)
IMMATURE GRANULOCYTES #: 0.08 E9/L
IMMATURE GRANULOCYTES %: 1.1 % (ref 0–5)
KETONES, URINE: >=80 MG/DL
LACTIC ACID, SEPSIS: 0.8 MMOL/L (ref 0.5–1.9)
LEUKOCYTE ESTERASE, URINE: NEGATIVE
LYMPHOCYTES ABSOLUTE: 0.62 E9/L (ref 1.5–4)
LYMPHOCYTES RELATIVE PERCENT: 8.8 % (ref 20–42)
MAGNESIUM: 1.6 MG/DL (ref 1.6–2.6)
MCH RBC QN AUTO: 31.2 PG (ref 26–35)
MCHC RBC AUTO-ENTMCNC: 33 % (ref 32–34.5)
MCV RBC AUTO: 94.5 FL (ref 80–99.9)
MONOCYTES ABSOLUTE: 0.4 E9/L (ref 0.1–0.95)
MONOCYTES RELATIVE PERCENT: 5.7 % (ref 2–12)
NEUTROPHILS ABSOLUTE: 5.94 E9/L (ref 1.8–7.3)
NEUTROPHILS RELATIVE PERCENT: 84 % (ref 43–80)
NITRITE, URINE: NEGATIVE
PDW BLD-RTO: 12.3 FL (ref 11.5–15)
PH UA: 6.5 (ref 5–9)
PLATELET # BLD: 175 E9/L (ref 130–450)
PMV BLD AUTO: 9.3 FL (ref 7–12)
POTASSIUM REFLEX MAGNESIUM: 3.4 MMOL/L (ref 3.5–5)
PROTEIN UA: NEGATIVE MG/DL
RBC # BLD: 3.62 E12/L (ref 3.5–5.5)
RBC UA: ABNORMAL /HPF (ref 0–2)
SARS-COV-2, NAAT: NOT DETECTED
SODIUM BLD-SCNC: 130 MMOL/L (ref 132–146)
SOURCE WET PREP: ABNORMAL
SPECIFIC GRAVITY UA: 1.01 (ref 1–1.03)
TOTAL PROTEIN: 6.4 G/DL (ref 6.4–8.3)
TRICHOMONAS PREP: ABNORMAL
UROBILINOGEN, URINE: 0.2 E.U./DL
WBC # BLD: 7.1 E9/L (ref 4.5–11.5)
WBC UA: ABNORMAL /HPF (ref 0–5)
YEAST WET PREP: ABNORMAL

## 2022-02-19 PROCEDURE — 2580000003 HC RX 258: Performed by: STUDENT IN AN ORGANIZED HEALTH CARE EDUCATION/TRAINING PROGRAM

## 2022-02-19 PROCEDURE — 87040 BLOOD CULTURE FOR BACTERIA: CPT

## 2022-02-19 PROCEDURE — 83605 ASSAY OF LACTIC ACID: CPT

## 2022-02-19 PROCEDURE — 1200000000 HC SEMI PRIVATE

## 2022-02-19 PROCEDURE — 80053 COMPREHEN METABOLIC PANEL: CPT

## 2022-02-19 PROCEDURE — 81001 URINALYSIS AUTO W/SCOPE: CPT

## 2022-02-19 PROCEDURE — 76815 OB US LIMITED FETUS(S): CPT

## 2022-02-19 PROCEDURE — 96361 HYDRATE IV INFUSION ADD-ON: CPT

## 2022-02-19 PROCEDURE — 96360 HYDRATION IV INFUSION INIT: CPT

## 2022-02-19 PROCEDURE — 6370000000 HC RX 637 (ALT 250 FOR IP): Performed by: STUDENT IN AN ORGANIZED HEALTH CARE EDUCATION/TRAINING PROGRAM

## 2022-02-19 PROCEDURE — 83735 ASSAY OF MAGNESIUM: CPT

## 2022-02-19 PROCEDURE — 87088 URINE BACTERIA CULTURE: CPT

## 2022-02-19 PROCEDURE — 6360000002 HC RX W HCPCS: Performed by: STUDENT IN AN ORGANIZED HEALTH CARE EDUCATION/TRAINING PROGRAM

## 2022-02-19 PROCEDURE — 85025 COMPLETE CBC W/AUTO DIFF WBC: CPT

## 2022-02-19 PROCEDURE — 99283 EMERGENCY DEPT VISIT LOW MDM: CPT

## 2022-02-19 PROCEDURE — 87210 SMEAR WET MOUNT SALINE/INK: CPT

## 2022-02-19 PROCEDURE — 87635 SARS-COV-2 COVID-19 AMP PRB: CPT

## 2022-02-19 RX ORDER — 0.9 % SODIUM CHLORIDE 0.9 %
1000 INTRAVENOUS SOLUTION INTRAVENOUS ONCE
Status: COMPLETED | OUTPATIENT
Start: 2022-02-19 | End: 2022-02-19

## 2022-02-19 RX ORDER — ACETAMINOPHEN 325 MG/1
650 TABLET ORAL ONCE
Status: COMPLETED | OUTPATIENT
Start: 2022-02-19 | End: 2022-02-19

## 2022-02-19 RX ADMIN — SODIUM CHLORIDE 1000 ML: 9 INJECTION, SOLUTION INTRAVENOUS at 20:12

## 2022-02-19 RX ADMIN — ACETAMINOPHEN 650 MG: 325 TABLET ORAL at 23:46

## 2022-02-19 RX ADMIN — CEFOXITIN SODIUM 2000 MG: 2 POWDER, FOR SOLUTION INTRAVENOUS at 23:46

## 2022-02-19 ASSESSMENT — PAIN DESCRIPTION - LOCATION: LOCATION: HEAD

## 2022-02-19 ASSESSMENT — ENCOUNTER SYMPTOMS
BACK PAIN: 0
DIARRHEA: 0
NAUSEA: 0
SHORTNESS OF BREATH: 0
CHEST TIGHTNESS: 0
COUGH: 0
VOMITING: 0
SORE THROAT: 0
ABDOMINAL PAIN: 1
ABDOMINAL DISTENTION: 0

## 2022-02-19 ASSESSMENT — PAIN SCALES - GENERAL
PAINLEVEL_OUTOF10: 5
PAINLEVEL_OUTOF10: 7

## 2022-02-19 ASSESSMENT — PAIN - FUNCTIONAL ASSESSMENT: PAIN_FUNCTIONAL_ASSESSMENT: 0-10

## 2022-02-19 NOTE — ED PROVIDER NOTES
HPI     Patient is a 32 y.o. female presents with a chief complaint of fevers  This has been occurring for 4 days. Patient states that it gets better with nothing. Patient states that it gets worse with nothing. Patient states that it is moderate in severity. Patient states it was gradual in onset. Patient is 21.5 weeks pregnant. Patient was here 4 days ago for a fever. Patient states that over the past 4 days she has had increasing fever. Patient states that she has also had a constant headache. Patient is also endorsing some new yellowish vaginal discharge. Patient states that she has been having constant abdominal pain that has not changed. Patient is denying any chest pain, shortness of breath, changes in urinary habits. Patient states that she is constipated but still having small bowel movements. Review of Systems   Constitutional: Negative for activity change, appetite change, chills, fatigue and fever. HENT: Negative for congestion, drooling and sore throat. Respiratory: Negative for cough, chest tightness and shortness of breath. Cardiovascular: Negative for chest pain and palpitations. Gastrointestinal: Positive for abdominal pain. Negative for abdominal distention, diarrhea, nausea and vomiting. Genitourinary: Positive for vaginal discharge. Negative for decreased urine volume, difficulty urinating, enuresis, flank pain, frequency and hematuria. Musculoskeletal: Negative for arthralgias, back pain and neck stiffness. Skin: Negative for rash and wound. Neurological: Negative for dizziness, facial asymmetry, light-headedness and headaches. Psychiatric/Behavioral: Negative for agitation, confusion and decreased concentration. Physical Exam  Vitals and nursing note reviewed. Constitutional:       Appearance: She is well-developed. HENT:      Head: Normocephalic and atraumatic.    Eyes:      Conjunctiva/sclera: Conjunctivae normal.   Cardiovascular:      Rate and Rhythm: Normal rate and regular rhythm. Heart sounds: Normal heart sounds. No murmur heard. Pulmonary:      Effort: Pulmonary effort is normal. No respiratory distress. Breath sounds: Normal breath sounds. No wheezing or rales. Abdominal:      General: Bowel sounds are normal.      Palpations: Abdomen is soft. Tenderness: There is no abdominal tenderness. There is no guarding or rebound. Comments: Gravid uterus with minimal tenderness to palpation. Musculoskeletal:      Cervical back: Normal range of motion and neck supple. Skin:     General: Skin is warm and dry. Neurological:      Mental Status: She is alert and oriented to person, place, and time. Cranial Nerves: No cranial nerve deficit. Coordination: Coordination normal.          Procedures     [facility]     ED Course as of 02/20/22 0208   Sat Feb 19, 2022 2211 Patient was examined with a closed cervical os. Significant purulence. Patient had culture sent. []   [id number] Discussed admission with OB/GYN he stated that patient should be admitted and get covered with antibiotics. Patient was cefoxitin because patient has a allergy to penicillins []      ED Course User Index  [JM] Jana Avalos MD      Patient is a 32 y.o. female presenting with fevers. Patient has previously been worked up for this. Patient took Tylenol prior to arrival.  Patient notes that fevers have been going on for 4 days. Patient stated that T-max was 104 °F.  Patient was tachycardic on arrival.  Patient was given fluids. Patient did state that she has any purulent discharge. Patient labs drawn. Patient had a ultrasound of the baby that was benign. Patient urine analysis done. Urine analysis was negative. Patient then had a Covid test that was done. Covid test was negative. Patient had a pelvic exam done that shows significant amount of purulent material.  Patient had a closed cervical os.   Given patient's unexplained fever and new purulent discharge from her cervix she will be treated with antibiotics for possible chorioamnionitis. Patient's abdominal exam was benign. Discussed this with the patient's obstetrician and patient will be admitted to the hospital.  Discussed this with patient who is agreeable to this plan. Patient has an allergy to penicillins and was started on a cephalosporin. Patient was seen and evaluated by myself and my attending Severiano Sousa, DO. Assessment and Plan discussed with attending provider, please see attestation for final plan of care. This note was done using dictation software and there may be some grammatical errors associated with this. Garrie Ormond, MD         ED Course as of 22 6577   Sat  Patient was examined with a closed cervical os. Significant purulence. Patient had culture sent. []   8945 Discussed admission with OB/GYN he stated that patient should be admitted and get covered with antibiotics. Patient was cefoxitin because patient has a allergy to penicillins []      ED Course User Index  [JM] Garrie Ormond, MD       --------------------------------------------- PAST HISTORY ---------------------------------------------  Past Medical History:  has a past medical history of Missed , Pregnancy, and Rectal bleed. Past Surgical History:  has a past surgical history that includes [place] tooth extraction; laparoscopy (); and Dilation and curettage of uterus (2021). Social History:  reports that she has never smoked. She has never used smokeless tobacco. She reports previous alcohol use. She reports that she does not use drugs. Family History: family history includes Other Cancer in her mother. The patients home medications have been reviewed.     Allergies: Latex and Penicillins    -------------------------------------------------- RESULTS -------------------------------------------------    LABS:  Results for orders placed or performed during the hospital encounter of 02/19/22   COVID-19, Rapid    Specimen: Nasopharyngeal Swab   Result Value Ref Range    SARS-CoV-2, NAAT Not Detected Not Detected   Wet prep, genital    Specimen: Vaginal   Result Value Ref Range    Trichomonas Prep None Seen     Yeast, Wet Prep None Seen     Clue Cells, Wet Prep <1+ (A)     Source Wet Prep VAGINAL    CBC with Auto Differential   Result Value Ref Range    WBC 7.1 4.5 - 11.5 E9/L    RBC 3.62 3.50 - 5.50 E12/L    Hemoglobin 11.3 (L) 11.5 - 15.5 g/dL    Hematocrit 34.2 34.0 - 48.0 %    MCV 94.5 80.0 - 99.9 fL    MCH 31.2 26.0 - 35.0 pg    MCHC 33.0 32.0 - 34.5 %    RDW 12.3 11.5 - 15.0 fL    Platelets 406 017 - 230 E9/L    MPV 9.3 7.0 - 12.0 fL    Neutrophils % 84.0 (H) 43.0 - 80.0 %    Immature Granulocytes % 1.1 0.0 - 5.0 %    Lymphocytes % 8.8 (L) 20.0 - 42.0 %    Monocytes % 5.7 2.0 - 12.0 %    Eosinophils % 0.3 0.0 - 6.0 %    Basophils % 0.1 0.0 - 2.0 %    Neutrophils Absolute 5.94 1.80 - 7.30 E9/L    Immature Granulocytes # 0.08 E9/L    Lymphocytes Absolute 0.62 (L) 1.50 - 4.00 E9/L    Monocytes Absolute 0.40 0.10 - 0.95 E9/L    Eosinophils Absolute 0.02 (L) 0.05 - 0.50 E9/L    Basophils Absolute 0.01 0.00 - 0.20 E9/L   Comprehensive Metabolic Panel w/ Reflex to MG   Result Value Ref Range    Sodium 130 (L) 132 - 146 mmol/L    Potassium reflex Magnesium 3.4 (L) 3.5 - 5.0 mmol/L    Chloride 96 (L) 98 - 107 mmol/L    CO2 21 (L) 22 - 29 mmol/L    Anion Gap 13 7 - 16 mmol/L    Glucose 83 74 - 99 mg/dL    BUN 3 (L) 6 - 20 mg/dL    CREATININE 0.4 (L) 0.5 - 1.0 mg/dL    GFR Non-African American >60 >=60 mL/min/1.73    GFR African American >60     Calcium 8.5 (L) 8.6 - 10.2 mg/dL    Total Protein 6.4 6.4 - 8.3 g/dL    Albumin 3.5 3.5 - 5.2 g/dL    Total Bilirubin 0.2 0.0 - 1.2 mg/dL    Alkaline Phosphatase 72 35 - 104 U/L    ALT 31 0 - 32 U/L    AST 37 (H) 0 - 31 U/L   Urinalysis with Microscopic   Result Value Ref Range    Color, UA Yellow Straw/Yellow Clarity, UA Clear Clear    Glucose, Ur Negative Negative mg/dL    Bilirubin Urine Negative Negative    Ketones, Urine >=80 (A) Negative mg/dL    Specific Gravity, UA 1.010 1.005 - 1.030    Blood, Urine Negative Negative    pH, UA 6.5 5.0 - 9.0    Protein, UA Negative Negative mg/dL    Urobilinogen, Urine 0.2 <2.0 E.U./dL    Nitrite, Urine Negative Negative    Leukocyte Esterase, Urine Negative Negative    WBC, UA NONE 0 - 5 /HPF    RBC, UA NONE 0 - 2 /HPF    Bacteria, UA NONE SEEN None Seen /HPF   Lactate, Sepsis   Result Value Ref Range    Lactic Acid, Sepsis 0.8 0.5 - 1.9 mmol/L   Magnesium   Result Value Ref Range    Magnesium 1.6 1.6 - 2.6 mg/dL       RADIOLOGY:  US OB 1 OR MORE FETUS LIMITED   Final Result   Single live intrauterine pregnancy with gestational age of 25 weeks and 1 day   by current sonographic biometry which is concordant to the clinical age   provided of 22 weeks and 3 days. The estimated due date based on current   ultrasound is June 24, 2022. Breech presentation with no evidence of previa. Visually normal amniotic fluid. Cervix is closed. Fetal heart rate was 141   beats per minute. Estimated fetal weight was 488 g. RECOMMENDATIONS:   Recommend scheduling patient for a complete anatomic survey at this time                 ------------------------- NURSING NOTES AND VITALS REVIEWED ---------------------------  Date / Time Roomed:  2/19/2022  6:08 PM  ED Bed Assignment:  [id number]    The nursing notes within the ED encounter and vital signs as below have been reviewed.      Patient Vitals for the past 24 hrs:   BP Temp Temp src Pulse Resp SpO2 Height Weight   02/20/22 0134 -- 98.6 °F (37 °C) -- -- -- -- -- --   02/20/22 0130 128/65 -- -- 92 16 -- -- --   02/20/22 0100 -- -- -- -- -- -- 5' 2\" (1.575 m) 222 lb (100.7 kg)   02/20/22 0059 (!) 121/58 98.6 °F (37 °C) Oral 98 18 97 % -- --   02/19/22 2333 128/64 100.5 °F (38.1 °C) Oral 98 16 97 % -- --   02/19/22 2247 106/62 99.5 °F (37.5 °C) Oral 106 16 100 % -- --   02/19/22 1810 -- 99.4 °F (37.4 °C) Oral -- -- -- -- --   02/19/22 1807 135/78 -- -- 129 16 99 % -- --       Oxygen Saturation Interpretation: Normal    ------------------------------------------ PROGRESS NOTES ------------------------------------------  See ED course for reevaluation    Counseling:  I have spoken with the patient and discussed todays results, in addition to providing specific details for the plan of care and counseling regarding the diagnosis and prognosis. Their questions are answered at this time and they are agreeable with the plan of admission.    --------------------------------- ADDITIONAL PROVIDER NOTES ---------------------------------  Consultations:   Spoke with Dr. Lissette Soto. Discussed case. They will admit the patient. This patient's ED course included: a personal history and physicial examination, re-evaluation prior to disposition, multiple bedside re-evaluations, IV medications, cardiac monitoring and continuous pulse oximetry    This patient has remained hemodynamically stable during their ED course. Diagnosis:  1. Vaginal discharge    2.  Chorioamnionitis in second trimester, single or unspecified fetus        Disposition:  Patient's disposition: Admit to med/surg floor  Patient's condition is Stable         Luis Enrique Rodriguez MD  Resident  02/20/22 6164

## 2022-02-20 ENCOUNTER — ANCILLARY PROCEDURE (OUTPATIENT)
Dept: OBGYN CLINIC | Age: 32
DRG: 832 | End: 2022-02-20
Payer: COMMERCIAL

## 2022-02-20 ENCOUNTER — APPOINTMENT (OUTPATIENT)
Dept: ULTRASOUND IMAGING | Age: 32
DRG: 832 | End: 2022-02-20
Payer: COMMERCIAL

## 2022-02-20 PROBLEM — Z3A.21 21 WEEKS GESTATION OF PREGNANCY: Status: ACTIVE | Noted: 2022-02-20

## 2022-02-20 LAB
ALBUMIN SERPL-MCNC: 3.3 G/DL (ref 3.5–5.2)
ALBUMIN SERPL-MCNC: 3.3 G/DL (ref 3.5–5.2)
ALP BLD-CCNC: 70 U/L (ref 35–104)
ALP BLD-CCNC: 75 U/L (ref 35–104)
ALT SERPL-CCNC: 56 U/L (ref 0–32)
ALT SERPL-CCNC: 65 U/L (ref 0–32)
ANION GAP SERPL CALCULATED.3IONS-SCNC: 11 MMOL/L (ref 7–16)
ANION GAP SERPL CALCULATED.3IONS-SCNC: 15 MMOL/L (ref 7–16)
AST SERPL-CCNC: 67 U/L (ref 0–31)
AST SERPL-CCNC: 81 U/L (ref 0–31)
BASOPHILS ABSOLUTE: 0.01 E9/L (ref 0–0.2)
BASOPHILS RELATIVE PERCENT: 0.2 % (ref 0–2)
BILIRUB SERPL-MCNC: 0.3 MG/DL (ref 0–1.2)
BILIRUB SERPL-MCNC: 0.3 MG/DL (ref 0–1.2)
BUN BLDV-MCNC: 3 MG/DL (ref 6–20)
BUN BLDV-MCNC: 4 MG/DL (ref 6–20)
CALCIUM SERPL-MCNC: 8 MG/DL (ref 8.6–10.2)
CALCIUM SERPL-MCNC: 8.6 MG/DL (ref 8.6–10.2)
CHLORIDE BLD-SCNC: 97 MMOL/L (ref 98–107)
CHLORIDE BLD-SCNC: 99 MMOL/L (ref 98–107)
CO2: 19 MMOL/L (ref 22–29)
CO2: 22 MMOL/L (ref 22–29)
CREAT SERPL-MCNC: 0.4 MG/DL (ref 0.5–1)
CREAT SERPL-MCNC: 0.5 MG/DL (ref 0.5–1)
EOSINOPHILS ABSOLUTE: 0.03 E9/L (ref 0.05–0.5)
EOSINOPHILS RELATIVE PERCENT: 0.5 % (ref 0–6)
GFR AFRICAN AMERICAN: >60
GFR AFRICAN AMERICAN: >60
GFR NON-AFRICAN AMERICAN: >60 ML/MIN/1.73
GFR NON-AFRICAN AMERICAN: >60 ML/MIN/1.73
GLUCOSE BLD-MCNC: 106 MG/DL (ref 74–99)
GLUCOSE BLD-MCNC: 86 MG/DL (ref 74–99)
HCT VFR BLD CALC: 31.7 % (ref 34–48)
HCT VFR BLD CALC: 33.6 % (ref 34–48)
HEMOGLOBIN: 10.5 G/DL (ref 11.5–15.5)
HEMOGLOBIN: 11.1 G/DL (ref 11.5–15.5)
IMMATURE GRANULOCYTES #: 0.07 E9/L
IMMATURE GRANULOCYTES %: 1.2 % (ref 0–5)
LYMPHOCYTES ABSOLUTE: 0.48 E9/L (ref 1.5–4)
LYMPHOCYTES RELATIVE PERCENT: 8 % (ref 20–42)
MCH RBC QN AUTO: 31.6 PG (ref 26–35)
MCH RBC QN AUTO: 31.7 PG (ref 26–35)
MCHC RBC AUTO-ENTMCNC: 33 % (ref 32–34.5)
MCHC RBC AUTO-ENTMCNC: 33.1 % (ref 32–34.5)
MCV RBC AUTO: 95.5 FL (ref 80–99.9)
MCV RBC AUTO: 96 FL (ref 80–99.9)
MONOCYTES ABSOLUTE: 0.31 E9/L (ref 0.1–0.95)
MONOCYTES RELATIVE PERCENT: 5.2 % (ref 2–12)
NEUTROPHILS ABSOLUTE: 5.09 E9/L (ref 1.8–7.3)
NEUTROPHILS RELATIVE PERCENT: 84.9 % (ref 43–80)
PDW BLD-RTO: 12.7 FL (ref 11.5–15)
PDW BLD-RTO: 12.7 FL (ref 11.5–15)
PLATELET # BLD: 171 E9/L (ref 130–450)
PLATELET # BLD: 178 E9/L (ref 130–450)
PMV BLD AUTO: 9 FL (ref 7–12)
PMV BLD AUTO: 9.2 FL (ref 7–12)
POTASSIUM SERPL-SCNC: 3.3 MMOL/L (ref 3.5–5)
POTASSIUM SERPL-SCNC: 3.5 MMOL/L (ref 3.5–5)
RBC # BLD: 3.32 E12/L (ref 3.5–5.5)
RBC # BLD: 3.5 E12/L (ref 3.5–5.5)
RBC # BLD: NORMAL 10*6/UL
SODIUM BLD-SCNC: 131 MMOL/L (ref 132–146)
SODIUM BLD-SCNC: 132 MMOL/L (ref 132–146)
TOTAL PROTEIN: 6 G/DL (ref 6.4–8.3)
TOTAL PROTEIN: 6.3 G/DL (ref 6.4–8.3)
WBC # BLD: 5.3 E9/L (ref 4.5–11.5)
WBC # BLD: 6 E9/L (ref 4.5–11.5)

## 2022-02-20 PROCEDURE — 85025 COMPLETE CBC W/AUTO DIFF WBC: CPT

## 2022-02-20 PROCEDURE — 80053 COMPREHEN METABOLIC PANEL: CPT

## 2022-02-20 PROCEDURE — 36415 COLL VENOUS BLD VENIPUNCTURE: CPT

## 2022-02-20 PROCEDURE — 2580000003 HC RX 258: Performed by: OBSTETRICS & GYNECOLOGY

## 2022-02-20 PROCEDURE — 86644 CMV ANTIBODY: CPT

## 2022-02-20 PROCEDURE — 1220000000 HC SEMI PRIVATE OB R&B

## 2022-02-20 PROCEDURE — 6370000000 HC RX 637 (ALT 250 FOR IP)

## 2022-02-20 PROCEDURE — 2580000003 HC RX 258: Performed by: SPECIALIST

## 2022-02-20 PROCEDURE — 6370000000 HC RX 637 (ALT 250 FOR IP): Performed by: OBSTETRICS & GYNECOLOGY

## 2022-02-20 PROCEDURE — 86665 EPSTEIN-BARR CAPSID VCA: CPT

## 2022-02-20 PROCEDURE — 80074 ACUTE HEPATITIS PANEL: CPT

## 2022-02-20 PROCEDURE — 6360000002 HC RX W HCPCS: Performed by: SPECIALIST

## 2022-02-20 PROCEDURE — 85027 COMPLETE CBC AUTOMATED: CPT

## 2022-02-20 PROCEDURE — 76815 OB US LIMITED FETUS(S): CPT | Performed by: OBSTETRICS & GYNECOLOGY

## 2022-02-20 PROCEDURE — 86645 CMV ANTIBODY IGM: CPT

## 2022-02-20 PROCEDURE — 76770 US EXAM ABDO BACK WALL COMP: CPT

## 2022-02-20 RX ORDER — METRONIDAZOLE 500 MG/1
500 TABLET ORAL EVERY 12 HOURS SCHEDULED
Status: DISCONTINUED | OUTPATIENT
Start: 2022-02-21 | End: 2022-02-20

## 2022-02-20 RX ORDER — SODIUM CHLORIDE, SODIUM LACTATE, POTASSIUM CHLORIDE, AND CALCIUM CHLORIDE .6; .31; .03; .02 G/100ML; G/100ML; G/100ML; G/100ML
500 INJECTION, SOLUTION INTRAVENOUS ONCE
Status: COMPLETED | OUTPATIENT
Start: 2022-02-20 | End: 2022-02-20

## 2022-02-20 RX ORDER — METRONIDAZOLE 500 MG/1
500 TABLET ORAL ONCE
Status: COMPLETED | OUTPATIENT
Start: 2022-02-20 | End: 2022-02-20

## 2022-02-20 RX ORDER — METRONIDAZOLE 500 MG/1
500 TABLET ORAL EVERY 12 HOURS SCHEDULED
Status: COMPLETED | OUTPATIENT
Start: 2022-02-21 | End: 2022-02-22

## 2022-02-20 RX ORDER — METRONIDAZOLE 500 MG/1
500 TABLET ORAL EVERY 12 HOURS SCHEDULED
Status: DISCONTINUED | OUTPATIENT
Start: 2022-02-21 | End: 2022-02-20 | Stop reason: CLARIF

## 2022-02-20 RX ORDER — ACETAMINOPHEN 325 MG/1
650 TABLET ORAL EVERY 4 HOURS PRN
Status: DISCONTINUED | OUTPATIENT
Start: 2022-02-20 | End: 2022-02-26 | Stop reason: HOSPADM

## 2022-02-20 RX ORDER — ACETAMINOPHEN 325 MG/1
TABLET ORAL
Status: COMPLETED
Start: 2022-02-20 | End: 2022-02-20

## 2022-02-20 RX ORDER — SODIUM CHLORIDE, SODIUM LACTATE, POTASSIUM CHLORIDE, CALCIUM CHLORIDE 600; 310; 30; 20 MG/100ML; MG/100ML; MG/100ML; MG/100ML
INJECTION, SOLUTION INTRAVENOUS CONTINUOUS
Status: DISCONTINUED | OUTPATIENT
Start: 2022-02-20 | End: 2022-02-26 | Stop reason: HOSPADM

## 2022-02-20 RX ORDER — IBUPROFEN 600 MG/1
600 TABLET ORAL ONCE
Status: COMPLETED | OUTPATIENT
Start: 2022-02-21 | End: 2022-02-20

## 2022-02-20 RX ORDER — IBUPROFEN 600 MG/1
TABLET ORAL
Status: COMPLETED
Start: 2022-02-20 | End: 2022-02-20

## 2022-02-20 RX ADMIN — ACETAMINOPHEN 650 MG: 325 TABLET ORAL at 06:56

## 2022-02-20 RX ADMIN — IBUPROFEN 600 MG: 600 TABLET ORAL at 23:42

## 2022-02-20 RX ADMIN — ACETAMINOPHEN 650 MG: 325 TABLET ORAL at 20:25

## 2022-02-20 RX ADMIN — SODIUM CHLORIDE, POTASSIUM CHLORIDE, SODIUM LACTATE AND CALCIUM CHLORIDE: 600; 310; 30; 20 INJECTION, SOLUTION INTRAVENOUS at 17:03

## 2022-02-20 RX ADMIN — SODIUM CHLORIDE, POTASSIUM CHLORIDE, SODIUM LACTATE AND CALCIUM CHLORIDE 500 ML: 600; 310; 30; 20 INJECTION, SOLUTION INTRAVENOUS at 02:31

## 2022-02-20 RX ADMIN — ACETAMINOPHEN 650 MG: 325 TABLET ORAL at 15:09

## 2022-02-20 RX ADMIN — ERTAPENEM SODIUM 1000 MG: 1 INJECTION, POWDER, LYOPHILIZED, FOR SOLUTION INTRAMUSCULAR; INTRAVENOUS at 10:36

## 2022-02-20 RX ADMIN — METRONIDAZOLE 500 MG: 500 TABLET ORAL at 20:25

## 2022-02-20 ASSESSMENT — PAIN SCALES - GENERAL
PAINLEVEL_OUTOF10: 0
PAINLEVEL_OUTOF10: 5
PAINLEVEL_OUTOF10: 4
PAINLEVEL_OUTOF10: 7
PAINLEVEL_OUTOF10: 0
PAINLEVEL_OUTOF10: 0
PAINLEVEL_OUTOF10: 5

## 2022-02-20 ASSESSMENT — PAIN DESCRIPTION - PAIN TYPE: TYPE: ACUTE PAIN

## 2022-02-20 ASSESSMENT — PAIN DESCRIPTION - DESCRIPTORS: DESCRIPTORS: SORE

## 2022-02-20 NOTE — PROGRESS NOTES
Call placed to Dr. Alberto Hoover for admission orders at this time.      Electronically signed by Pedro Kumar RN on 2/20/2022 at 1:10 AM

## 2022-02-20 NOTE — ED NOTES
Report given to TANGELA GAO [facility], [place].      Ashley Simpson RN  02/19/22 5672

## 2022-02-20 NOTE — CONSULTS
[address] [place] [address] [facility]    Consultation Note     Admit Date: 2022  6:08 PM    Reason for Consult: Fever and left lower quadrant pain    Attending Physician:  Leif Kay DO     Chief Complaint: Fever and constipation    HISTORY OF PRESENT ILLNESS:   The patient is a 32 y.o. woman not known to the Infectious Diseases service. The patient is admitted through the emergency room after having about 6 days of temperatures as high as 104. Fever chills and headaches associated with the same. Patient has been constipated to her pregnancy she is about 5 months out. Is been no nausea vomiting she has no frequency burning dysuria. She did mention that she has some vaginal discharge. In the emergency room they did do a vaginal prep that showed some clue cells. In addition she has continued to have this left lower quadrant pain. Ultrasound the kidneys have shown bilateral mild hydronephrosis and no stones were identified. She got a dose of Mefoxin in the emergency room and I started her on ertapenem. .  Labs showed that the white count is 7.1 hemoglobin is 11.3  And a BUN and creatinine 3 and 0.4. For the most part the UA was nondiagnostic. At the time of the consultation I had concern for renal lithiasis but the ultrasound has ruled this out.         Past Medical History:        Diagnosis Date    Missed      Pregnancy     21-7 weeks    Rectal bleed      Past Surgical History:        Procedure Laterality Date    DILATION AND CURETTAGE OF UTERUS  2021    LAPAROSCOPY  2014    WISDOM TOOTH EXTRACTION       Current Medications:   Scheduled Meds:   ertapenem (INVanz) IVPB  1,000 mg IntraVENous Q24H     Continuous Infusions:   lactated ringers 125 mL/hr at 22 0259     PRN Meds:acetaminophen    Allergies:  Latex and Penicillins    Social History:   Social History     Socioeconomic History    Marital status: Unknown     Spouse name: Not on file    Number of children: Not on file    Years of education: Not on file    Highest education level: Not on file   Occupational History    Not on file   Tobacco Use    Smoking status: Never Smoker    Smokeless tobacco: Never Used   Vaping Use    Vaping Use: Never used   Substance and Sexual Activity    Alcohol use: Not Currently    Drug use: Never    Sexual activity: Not on file   Other Topics Concern    Not on file   Social History Narrative    Not on file     Social Determinants of Health     Financial Resource Strain: Low Risk     Difficulty of Paying Living Expenses: Not hard at all   Food Insecurity: No Food Insecurity    Worried About [address] in the Last Year: Never true    [facility] in the Last Year: Never true   Transportation Needs:     Lack of Transportation (Medical): Not on file    Lack of Transportation (Non-Medical): Not on file   Physical Activity:     Days of Exercise per Week: Not on file    Minutes of Exercise per Session: Not on file   Stress:     Feeling of Stress : Not on file   Social Connections:     Frequency of Communication with Friends and Family: Not on file    Frequency of Social Gatherings with Friends and Family: Not on file    Attends Jainism Services: Not on file    Active Member of [address] [place] [address] or Organizations: Not on file    Attends Club or Organization Meetings: Not on file    Marital Status: Not on file   Intimate Partner Violence:     Fear of Current or Ex-Partner: Not on file    Emotionally Abused: Not on file    Physically Abused: Not on file    Sexually Abused: Not on file   Housing Stability:     Unable to Pay for Housing in the Last Year: Not on file    Number of [place] in the Last Year: Not on file    Unstable Housing in the Last Year: Not on file     Tobacco: No  Alcohol: No  Pets: Dog and cat; however, patient does not do the cat litter  Travel: No    Family History:       Problem Relation Age of Onset    Other Cancer Mother    . Otherwise non-pertinent to the chief complaint. REVIEW OF SYSTEMS:    CONSTITUTIONAL: Positive chills, positive fevers or night sweats. No loss of weight. EYES:  No double vision or drainage from eyes, ears or throat. HEENT:  No neck stiffness. No dysphagia. No drainage from eyes, ears or throat  RESPIRATORY:  No cough, productive sputum or hemoptysis. CARDIOVASCULAR:  No chest pain, palpitations, orthopnea or dyspnea on exertion. GASTROINTESTINAL:  No nausea, vomiting, positive constipation or hematochezia   GENITOURINARY:  No frequency burning dysuria or hematuria. INTEGUMENT/BREAST:  No rash or breast masses. HEMATOLOGIC/LYMPHATIC:  No lymphadenopathy or blood dyscrasics. ALLERGIC/IMMUNOLOGIC:  No anaphylaxis. ENDOCRINE:  No polyuria or polydipsia or temperature intolerance. MUSCULOSKELETAL:  No myalgia or arthralgia. Full ROM. NEUROLOGICAL:  No focal motor sensory deficit. BEHAVIOR/PSYCH:  No psychosis. PHYSICAL EXAM:    Vitals:    /60   Pulse 90   Temp 99.4 °F (37.4 °C) (Oral)   Resp 16   Ht 5' 2\" (1.575 m)   Wt 222 lb (100.7 kg)   LMP 09/20/2021 (Exact Date)   SpO2 97%   BMI 40.60 kg/m²   Constitutional: The patient is awake, alert, and oriented. Skin: Warm and dry. No rashes were noted. No jaundice. HEENT: Eyes show round, and reactive pupils. Moist mucous membranes, no ulcerations, no thrush. Neck: Supple to movements. No lymphadenopathy. Chest: No use of accessory muscles to breathe. Symmetrical expansion. Auscultation reveals no wheezing, crackles, or rhonchi. Cardiovascular: S1 and S2 are rhythmic and regular. No murmurs appreciated. Abdomen: Positive bowel sounds to auscultation. Benign to palpation. No masses felt. No hepatosplenomegaly. Genitourinary: No flank pain female  Extremities: No clubbing, no cyanosis, 1+ bilateral lower leg  edema.   Musculoskeletal: Equal and symmetrical  Neurological: No focal  Lines: peripheral      CBC+dif:  Recent Labs 02/19/22 2014   WBC 7.1   HGB 11.3*   HCT 34.2   MCV 94.5      NEUTROABS 5.94     No results found for: CRP  No results found for: CRPHS  No results found for: SEDRATE  Lab Results   Component Value Date    ALT 31 02/19/2022    AST 37 (H) 02/19/2022    ALKPHOS 72 02/19/2022    BILITOT 0.2 02/19/2022     Lab Results   Component Value Date     02/19/2022    K 3.4 02/19/2022    CL 96 02/19/2022    CO2 21 02/19/2022    BUN 3 02/19/2022    CREATININE 0.4 02/19/2022    GFRAA >60 02/19/2022    LABGLOM >60 02/19/2022    GLUCOSE 83 02/19/2022    PROT 6.4 02/19/2022    LABALBU 3.5 02/19/2022    CALCIUM 8.5 02/19/2022    BILITOT 0.2 02/19/2022    ALKPHOS 72 02/19/2022    AST 37 02/19/2022    ALT 31 02/19/2022       No results found for: PROTIME, INR    No results found for: TSH    Lab Results   Component Value Date    COLORU Yellow 02/19/2022    PHUR 6.5 02/19/2022    WBCUA NONE 02/19/2022    RBCUA NONE 02/19/2022    BACTERIA NONE SEEN 02/19/2022    CLARITYU Clear 02/19/2022    SPECGRAV 1.010 02/19/2022    LEUKOCYTESUR Negative 02/19/2022    UROBILINOGEN 0.2 02/19/2022    BILIRUBINUR Negative 02/19/2022    BLOODU Negative 02/19/2022    GLUCOSEU Negative 02/19/2022       No results found for: CRD9OPC, BEART, J2UTDESN, PHART, THGBART, ZHN6LNN, PO2ART, TBW9ADD  Radiology:  US OB 1 OR MORE FETUS LIMITED   Final Result      US RETROPERITONEAL COMPLETE   Final Result   1. Mild bilateral hydronephrosis. Otherwise normal appearance of the   kidneys. 2.  Bladder not well distended for this exam and could not be evaluated. US OB 1 OR MORE FETUS LIMITED   Final Result   Single live intrauterine pregnancy with gestational age of 25 weeks and 1 day   by current sonographic biometry which is concordant to the clinical age   provided of 25 weeks and 3 days. The estimated due date based on current   ultrasound is June 24, 2022. Breech presentation with no evidence of previa. Visually normal amniotic fluid. Cervix is closed. Fetal heart rate was 141   beats per minute. Estimated fetal weight was 488 g. RECOMMENDATIONS:   Recommend scheduling patient for a complete anatomic survey at this time             Microbiology:  Pending  No results for input(s): BC in the last 72 hours. No results for input(s): ORG in the last 72 hours. No results for input(s): Katerina Bustard in the last 72 hours. No results for input(s): STREPNEUMAGU in the last 72 hours. No results for input(s): LP1UAG in the last 72 hours. No results for input(s): ASO in the last 72 hours. No results for input(s): CULTRESP in the last 72 hours. 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 I will be discussing this case with the treating physicians.       Electronically signed by Jorge Montgomery MD on 2/20/2022 at 3:08 PM

## 2022-02-20 NOTE — ED NOTES
In patients room while  performed vaginal exam. Patient tolerated well. Swabs sent.       Dipika Brink RN  02/19/22 0580

## 2022-02-20 NOTE — PROGRESS NOTES
Pt arrives to unit via wheelchair accompanied by staff nurses from other unit and FOB. Pt reports that she has been in the ER this evening and Dr. Molly Huff wanted her to be transferred to antepartum. Pt is calm with respirations even and unlabored. Fetal doppler obtained and was 156 bpm. Pt reports good fetal movement, reports thick yellow discharge, and denies leaking of fluid. Dr. Nando Rodriguez informed of arrival. Pt OB history, current compliant and care received in ER reviewed with Dr. Nando Rodriguez.  PT oriented to room and call light placed within reach

## 2022-02-20 NOTE — H&P
CHIEF COMPLAINT:  Fever of unknown origin, headache and LLQ pain       HISTORY OF PRESENT ILLNESS:    The patient is a 32 y.o. female , Patient's last menstrual period was 2021 (exact date). ,  at [address] [place] [address]. Had fever for 5 days, max 104 F, with chills and severe headache, now settled with Tylenol. Has constipation and nausea, no vomiting. No UT symptoms, UA was negative and awaiting c/s result. No change in fetal movements. [facility] normal. Received Covid 19 and flu vaccines, rapid covid test negative x 2.    OB History        3    Para   1    Term   1            AB   1    Living           SAB   1    IAB        Ectopic        Molar        Multiple        Live Births                Patient presents with a chief complaint as above and is being admitted for evaluation    Estimated Due Date: Estimated Date of Delivery: 22    PRENATAL CARE:  Complicated by:   Patient Active Problem List   Diagnosis Code    Chorioamnionitis in second trimester, fetus 3 O38.12       PAST OB HISTORY  OB History        3    Para   1    Term   1            AB   1    Living           SAB   1    IAB        Ectopic        Molar        Multiple        Live Births                    Past Medical History:        Diagnosis Date    Missed      Pregnancy     21-7 weeks    Rectal bleed        Past Surgical History:        Procedure Laterality Date    DILATION AND CURETTAGE OF UTERUS  2021    LAPAROSCOPY  2014    WISDOM TOOTH EXTRACTION         Social History:    TOBACCO:   reports that she has never smoked. She has never used smokeless tobacco.  ETOH:   reports previous alcohol use. DRUGS:   reports no history of drug use.     Family History:       Problem Relation Age of Onset    Other Cancer Mother        Medications Prior to Admission:  Medications Prior to Admission: acetaminophen (TYLENOL) 500 MG tablet, Take 1 tablet by mouth 4 times daily as needed for Pain  doxylamine-pyridoxine 10-10 MG TBEC, Take 1 tablet by mouth 2 times daily for 10 days If needed for nausea/vomiting  docusate sodium (COLACE) 100 MG capsule, Take 100 mg by mouth daily  Prenatal Vit-Fe Fumarate-FA (PRENATAL VITAMIN PO), Take by mouth    Allergies:  Latex and Penicillins    Review of Systems:   Constitutional : No fever, no chills   HEENT: No headache, no visual changes, no rhinorrhea, no sore throat   Cardiovascular : No pain, no palpitations, no edema   Respiratory : No pain, no shortness of breath   Gastrointestinal : No N/V, no D/C, no abdominal pain   Genitourinary : No dysuria, hematuria and no incontinence   Musculoskeletal : No myalgia, No back pain  Neurological : No numbness, no tingling, no tremors. No history of seizures  All other systems were reported as negative. PHYSICAL EXAM:    General appearance:  awake, alert, cooperative, no apparent distress, and appears stated age  Neurologic:  Awake, alert, oriented to name, place and time. Lungs:  No increased work of breathing, good air exchange, clear to auscultation bilaterally, no crackles or wheezing  Heart:  Normal apical impulse, regular rate and rhythm, normal S1 and S2, no S3 or S4, and no murmur noted  Abdomen:  Her uterus is gravid, soft and non tender. Mild tenderness in LLQ. No rebound tenderness. No CVA tenderness. Pelvis:  External Genitalia: General appearance; normal, Hair distribution; normal, Lesions absent  Cervix: VE not repeated. Extremities: Mild peripheral edema is noted.      /65   Pulse 92   Temp 98.6 °F (37 °C)   Resp 16   Ht 5' 2\" (1.575 m)   Wt 222 lb (100.7 kg)   LMP 09/20/2021 (Exact Date)   SpO2 97%   BMI 40.60 kg/m²     General Labs:    CBC:   Lab Results   Component Value Date    WBC 7.1 02/19/2022    RBC 3.62 02/19/2022    HGB 11.3 02/19/2022    HCT 34.2 02/19/2022    MCV 94.5 02/19/2022    RDW 12.3 02/19/2022     02/19/2022     CMP:    Lab Results   Component Value Date     02/19/2022    K 3.4 02/19/2022    CL 96 02/19/2022    CO2 21 02/19/2022    BUN 3 02/19/2022    PROT 6.4 02/19/2022     U/A:  No components found for: Emily Sajan, USPGRAV, UPH, UPROTEIN, UGLUCOSE, UKETONE, UBILI, UBLOOD, UNITRITE, UUROBIL, ULEUKEST, USQEPI, Conrad, UWBC, Xena, Synchari, Sigourney    ASSESSMENT AND PLAN:  Fever, chills and headache, origin unknown  LLQ pain and constipation, need to r/o diverticular disease.   Dehydration with ketonuria, will start IV hydration  Observation and MFM consult  ID consult in am

## 2022-02-21 ENCOUNTER — ANCILLARY PROCEDURE (OUTPATIENT)
Dept: OBGYN CLINIC | Age: 32
DRG: 832 | End: 2022-02-21
Payer: COMMERCIAL

## 2022-02-21 PROBLEM — O41.1210: Status: ACTIVE | Noted: 2022-02-21

## 2022-02-21 LAB
ALBUMIN SERPL-MCNC: 2.8 G/DL (ref 3.5–5.2)
ALP BLD-CCNC: 67 U/L (ref 35–104)
ALT SERPL-CCNC: 65 U/L (ref 0–32)
ANION GAP SERPL CALCULATED.3IONS-SCNC: 12 MMOL/L (ref 7–16)
AST SERPL-CCNC: 78 U/L (ref 0–31)
BILIRUB SERPL-MCNC: 0.2 MG/DL (ref 0–1.2)
BUN BLDV-MCNC: 4 MG/DL (ref 6–20)
CALCIUM SERPL-MCNC: 8.1 MG/DL (ref 8.6–10.2)
CHLORIDE BLD-SCNC: 102 MMOL/L (ref 98–107)
CO2: 20 MMOL/L (ref 22–29)
CREAT SERPL-MCNC: 0.4 MG/DL (ref 0.5–1)
GFR AFRICAN AMERICAN: >60
GFR NON-AFRICAN AMERICAN: >60 ML/MIN/1.73
GLUCOSE BLD-MCNC: 93 MG/DL (ref 74–99)
HAV IGM SER IA-ACNC: NORMAL
HCT VFR BLD CALC: 30.5 % (ref 34–48)
HEMOGLOBIN: 10 G/DL (ref 11.5–15.5)
HEPATITIS B CORE IGM ANTIBODY: NORMAL
HEPATITIS B SURFACE ANTIGEN INTERPRETATION: NORMAL
HEPATITIS C ANTIBODY INTERPRETATION: NORMAL
MCH RBC QN AUTO: 31.1 PG (ref 26–35)
MCHC RBC AUTO-ENTMCNC: 32.8 % (ref 32–34.5)
MCV RBC AUTO: 94.7 FL (ref 80–99.9)
PDW BLD-RTO: 12.7 FL (ref 11.5–15)
PLATELET # BLD: 154 E9/L (ref 130–450)
PMV BLD AUTO: 9.3 FL (ref 7–12)
POTASSIUM SERPL-SCNC: 3.6 MMOL/L (ref 3.5–5)
RBC # BLD: 3.22 E12/L (ref 3.5–5.5)
SODIUM BLD-SCNC: 134 MMOL/L (ref 132–146)
TOTAL PROTEIN: 5.7 G/DL (ref 6.4–8.3)
WBC # BLD: 5.2 E9/L (ref 4.5–11.5)

## 2022-02-21 PROCEDURE — 6370000000 HC RX 637 (ALT 250 FOR IP): Performed by: OBSTETRICS & GYNECOLOGY

## 2022-02-21 PROCEDURE — 76821 MIDDLE CEREBRAL ARTERY ECHO: CPT | Performed by: OBSTETRICS & GYNECOLOGY

## 2022-02-21 PROCEDURE — 85027 COMPLETE CBC AUTOMATED: CPT

## 2022-02-21 PROCEDURE — 80053 COMPREHEN METABOLIC PANEL: CPT

## 2022-02-21 PROCEDURE — 2580000003 HC RX 258: Performed by: SPECIALIST

## 2022-02-21 PROCEDURE — 2580000003 HC RX 258: Performed by: OBSTETRICS & GYNECOLOGY

## 2022-02-21 PROCEDURE — 1220000000 HC SEMI PRIVATE OB R&B

## 2022-02-21 PROCEDURE — 36415 COLL VENOUS BLD VENIPUNCTURE: CPT

## 2022-02-21 PROCEDURE — 6360000002 HC RX W HCPCS: Performed by: SPECIALIST

## 2022-02-21 PROCEDURE — 76819 FETAL BIOPHYS PROFIL W/O NST: CPT | Performed by: OBSTETRICS & GYNECOLOGY

## 2022-02-21 PROCEDURE — 76815 OB US LIMITED FETUS(S): CPT | Performed by: OBSTETRICS & GYNECOLOGY

## 2022-02-21 PROCEDURE — 76820 UMBILICAL ARTERY ECHO: CPT | Performed by: OBSTETRICS & GYNECOLOGY

## 2022-02-21 RX ORDER — POLYETHYLENE GLYCOL 3350 17 G/17G
17 POWDER, FOR SOLUTION ORAL DAILY
Status: DISCONTINUED | OUTPATIENT
Start: 2022-02-21 | End: 2022-02-26 | Stop reason: HOSPADM

## 2022-02-21 RX ADMIN — SODIUM CHLORIDE, POTASSIUM CHLORIDE, SODIUM LACTATE AND CALCIUM CHLORIDE: 600; 310; 30; 20 INJECTION, SOLUTION INTRAVENOUS at 09:26

## 2022-02-21 RX ADMIN — ERTAPENEM SODIUM 1000 MG: 1 INJECTION, POWDER, LYOPHILIZED, FOR SOLUTION INTRAMUSCULAR; INTRAVENOUS at 08:35

## 2022-02-21 RX ADMIN — POLYETHYLENE GLYCOL 3350 17 G: 17 POWDER, FOR SOLUTION ORAL at 12:25

## 2022-02-21 RX ADMIN — ACETAMINOPHEN 650 MG: 325 TABLET ORAL at 14:38

## 2022-02-21 RX ADMIN — ACETAMINOPHEN 650 MG: 325 TABLET ORAL at 20:18

## 2022-02-21 RX ADMIN — METRONIDAZOLE 500 MG: 500 TABLET ORAL at 08:39

## 2022-02-21 RX ADMIN — ACETAMINOPHEN 650 MG: 325 TABLET ORAL at 04:15

## 2022-02-21 RX ADMIN — ACETAMINOPHEN 650 MG: 325 TABLET ORAL at 08:39

## 2022-02-21 RX ADMIN — METRONIDAZOLE 500 MG: 500 TABLET ORAL at 20:30

## 2022-02-21 ASSESSMENT — PAIN SCALES - GENERAL
PAINLEVEL_OUTOF10: 0
PAINLEVEL_OUTOF10: 0
PAINLEVEL_OUTOF10: 5
PAINLEVEL_OUTOF10: 3
PAINLEVEL_OUTOF10: 7

## 2022-02-21 NOTE — PROGRESS NOTES
Assumed care of patient. Patient denies VB, LOF and  CTX. Patient states she had some yellow tinged vaginal discharge thismorning. Patient states decrease in fetal movement. Heart tones done 150-160's.

## 2022-02-21 NOTE — PROGRESS NOTES
Spoke with Dr. Larisa Dupree in regards to pts cbc and cmp results. Orders received to update m on results.

## 2022-02-21 NOTE — PROGRESS NOTES
[address] [place] [address] [facility]  Progress Note      Chief Complaint   Patient presents with    Migraine     seen thursday for same thing    Fever     starting tuesday states its 104    Vaginal Discharge       SUBJECTIVE:  Patient is tolerating medications. No reported adverse drug reactions. No nausea, vomiting, diarrhea. Afebrile but still has pain not only on the left lower quadrant also diffusely on the right. LFTs are going up slightly and platelets are coming down. Review of systems:  As stated above in the chief complaint, otherwise negative. Medications:  Scheduled Meds:   polyethylene glycol  17 g Oral Daily    ertapenem (INVanz) IVPB  1,000 mg IntraVENous Q24H    metroNIDAZOLE  500 mg Oral 2 times per day     Continuous Infusions:   lactated ringers 125 mL/hr at 22 0926     PRN Meds:acetaminophen    OBJECTIVE:  /69   Pulse 110   Temp 99.9 °F (37.7 °C) (Axillary)   Resp 18   Ht 5' 2\" (1.575 m)   Wt 222 lb (100.7 kg)   LMP 2021 (Exact Date)   SpO2 97%   BMI 40.60 kg/m²   Temp  Av °F (37.2 °C)  Min: 97.9 °F (36.6 °C)  Max: 100.2 °F (37.9 °C)  Constitutional: The patient is awake, alert, and oriented. Skin: Warm and dry. No rashes were noted. HEENT: Round and reactive pupils. Moist mucous membranes. No ulcerations or thrush. Neck: Supple to movements. Chest: No use of accessory muscles to breathe. Symmetrical expansion. No wheezing, crackles or rhonchi. Cardiovascular: S1 and S2 are rhythmic and regular. No murmurs appreciated. Abdomen: Positive bowel sounds to auscultation. Benign to palpation. No masses felt. No hepatosplenomegaly. 5 months gravid abdomen with left lower quadrant pain as well as right sided pain diffusely  Genitourinary: Female  Extremities: No clubbing, no cyanosis, no edema.   Lines: peripheral    Laboratory and Tests Review:  Lab Results   Component Value Date    WBC 5.2 2022    WBC 5.3 2022    WBC 6.0 02/20/2022    HGB 10.0 (L) 02/21/2022    HCT 30.5 (L) 02/21/2022    MCV 94.7 02/21/2022     02/21/2022     Lab Results   Component Value Date    NEUTROABS 5.09 02/20/2022    NEUTROABS 5.94 02/19/2022    NEUTROABS 8.15 (H) 02/17/2022     No results found for: CRPHS  Lab Results   Component Value Date    ALT 65 (H) 02/21/2022    AST 78 (H) 02/21/2022    ALKPHOS 67 02/21/2022    BILITOT 0.2 02/21/2022     Lab Results   Component Value Date     02/21/2022    K 3.6 02/21/2022    K 3.4 02/19/2022     02/21/2022    CO2 20 02/21/2022    BUN 4 02/21/2022    CREATININE 0.4 02/21/2022    CREATININE 0.5 02/20/2022    CREATININE 0.4 02/20/2022    GFRAA >60 02/21/2022    LABGLOM >60 02/21/2022    GLUCOSE 93 02/21/2022    PROT 5.7 02/21/2022    LABALBU 2.8 02/21/2022    CALCIUM 8.1 02/21/2022    BILITOT 0.2 02/21/2022    ALKPHOS 67 02/21/2022    AST 78 02/21/2022    ALT 65 02/21/2022     No results found for: CRP  No results found for: [address]  Radiology:      Microbiology:   Lab Results   Component Value Date    BC 24 Hours no growth 02/19/2022    BC 24 Hours no growth 02/17/2022     Lab Results   Component Value Date    BLOODCULT2 24 Hours no growth 02/19/2022    BLOODCULT2 24 Hours no growth 02/17/2022     No results found for: WNDABS  No results found for: RESPSMEAR  No results found for: MPNEUMO, CLAMYDCU, LABLEGI, AFBCX, FUNGSM, LABFUNG  No results found for: CULTRESP  No results found for: CXCATHTIP  No results found for: BFCS  No results found for: CXSURG  Urine Culture, Routine   Date Value Ref Range Status   02/19/2022 <10,000 CFU/mL  Mixed gram positive organisms    Preliminary     No results found for: [facility]      Assessment:  · Left lower quadrant pain the patient has been obstipated for about a week and had an ultrasound that effectively ruled out renal lithiasis although she has bilateral hydronephrosis. Today the pain is diffuse on the right side.   · Gardnerella vaginitis-seems to be improving  · Suspect chorioendometritis especially with the LFT slightly going up as well as platelets coming down and the pain becoming more diffuse   · Rule out translocation from the obstipation and constipation from the left lower quadrant     Plan:    · Cont Invanz  · OB/GYN to address the Gardnerella  · Discussed with OB/GYN today's findings and suggestion  · Follow-up clinically especially for the possibility evolution of chorio- endometritis  · Check cultures  · Baseline ESR, CRP  · Monitor labs  · Will follow with you        Aury Fitch MD  11:15 AM  2/21/2022

## 2022-02-21 NOTE — PROGRESS NOTES
Patient diaphoretic, up to bathroom washed up and gown changed. Up and changed gown. Bed linen also changed. Patient states that the body aches have decreased.

## 2022-02-21 NOTE — PROGRESS NOTES
Patient states body aches have decreased from 7/10 to 3/10 since PO tylenol administration. Denies dizziness and SOB. Denies LOF from vagina. Repeat CBC and CMP drawn.

## 2022-02-22 ENCOUNTER — APPOINTMENT (OUTPATIENT)
Dept: ULTRASOUND IMAGING | Age: 32
DRG: 832 | End: 2022-02-22
Payer: COMMERCIAL

## 2022-02-22 ENCOUNTER — ANCILLARY PROCEDURE (OUTPATIENT)
Dept: OBGYN CLINIC | Age: 32
DRG: 832 | End: 2022-02-22
Payer: COMMERCIAL

## 2022-02-22 LAB
ALBUMIN SERPL-MCNC: 3.1 G/DL (ref 3.5–5.2)
ALP BLD-CCNC: 88 U/L (ref 35–104)
ALT SERPL-CCNC: 105 U/L (ref 0–32)
ANION GAP SERPL CALCULATED.3IONS-SCNC: 12 MMOL/L (ref 7–16)
ANISOCYTOSIS: ABNORMAL
AST SERPL-CCNC: 125 U/L (ref 0–31)
BASOPHILS ABSOLUTE: 0 E9/L (ref 0–0.2)
BASOPHILS RELATIVE PERCENT: 0 % (ref 0–2)
BILIRUB SERPL-MCNC: 0.2 MG/DL (ref 0–1.2)
BLOOD CULTURE, ROUTINE: NORMAL
BUN BLDV-MCNC: 2 MG/DL (ref 6–20)
BURR CELLS: ABNORMAL
CALCIUM SERPL-MCNC: 8.5 MG/DL (ref 8.6–10.2)
CHLORIDE BLD-SCNC: 103 MMOL/L (ref 98–107)
CO2: 22 MMOL/L (ref 22–29)
CREAT SERPL-MCNC: 0.4 MG/DL (ref 0.5–1)
CULTURE, BLOOD 2: NORMAL
EOSINOPHILS ABSOLUTE: 0 E9/L (ref 0.05–0.5)
EOSINOPHILS RELATIVE PERCENT: 0 % (ref 0–6)
GFR AFRICAN AMERICAN: >60
GFR NON-AFRICAN AMERICAN: >60 ML/MIN/1.73
GLUCOSE BLD-MCNC: 103 MG/DL (ref 74–99)
HCT VFR BLD CALC: 31.8 % (ref 34–48)
HEMOGLOBIN: 10.5 G/DL (ref 11.5–15.5)
LYMPHOCYTES ABSOLUTE: 0.25 E9/L (ref 1.5–4)
LYMPHOCYTES RELATIVE PERCENT: 5.2 % (ref 20–42)
MCH RBC QN AUTO: 31.4 PG (ref 26–35)
MCHC RBC AUTO-ENTMCNC: 33 % (ref 32–34.5)
MCV RBC AUTO: 95.2 FL (ref 80–99.9)
MONOCYTES ABSOLUTE: 0.2 E9/L (ref 0.1–0.95)
MONOCYTES RELATIVE PERCENT: 3.5 % (ref 2–12)
MYELOCYTE PERCENT: 0.9 % (ref 0–0)
NEUTROPHILS ABSOLUTE: 4.55 E9/L (ref 1.8–7.3)
NEUTROPHILS RELATIVE PERCENT: 90.4 % (ref 43–80)
NUCLEATED RED BLOOD CELLS: 0 /100 WBC
PDW BLD-RTO: 12.7 FL (ref 11.5–15)
PLATELET # BLD: 159 E9/L (ref 130–450)
PMV BLD AUTO: 9.4 FL (ref 7–12)
POIKILOCYTES: ABNORMAL
POTASSIUM SERPL-SCNC: 3.1 MMOL/L (ref 3.5–5)
RBC # BLD: 3.34 E12/L (ref 3.5–5.5)
SODIUM BLD-SCNC: 137 MMOL/L (ref 132–146)
TOTAL PROTEIN: 5.9 G/DL (ref 6.4–8.3)
URINE CULTURE, ROUTINE: NORMAL
WBC # BLD: 5 E9/L (ref 4.5–11.5)

## 2022-02-22 PROCEDURE — 36415 COLL VENOUS BLD VENIPUNCTURE: CPT

## 2022-02-22 PROCEDURE — 2580000003 HC RX 258: Performed by: OBSTETRICS & GYNECOLOGY

## 2022-02-22 PROCEDURE — 2580000003 HC RX 258: Performed by: SPECIALIST

## 2022-02-22 PROCEDURE — 80053 COMPREHEN METABOLIC PANEL: CPT

## 2022-02-22 PROCEDURE — 76819 FETAL BIOPHYS PROFIL W/O NST: CPT | Performed by: OBSTETRICS & GYNECOLOGY

## 2022-02-22 PROCEDURE — 76815 OB US LIMITED FETUS(S): CPT | Performed by: OBSTETRICS & GYNECOLOGY

## 2022-02-22 PROCEDURE — 6360000002 HC RX W HCPCS

## 2022-02-22 PROCEDURE — 6360000002 HC RX W HCPCS: Performed by: SPECIALIST

## 2022-02-22 PROCEDURE — 6370000000 HC RX 637 (ALT 250 FOR IP): Performed by: OBSTETRICS & GYNECOLOGY

## 2022-02-22 PROCEDURE — 76816 OB US FOLLOW-UP PER FETUS: CPT | Performed by: OBSTETRICS & GYNECOLOGY

## 2022-02-22 PROCEDURE — 76705 ECHO EXAM OF ABDOMEN: CPT

## 2022-02-22 PROCEDURE — 76820 UMBILICAL ARTERY ECHO: CPT | Performed by: OBSTETRICS & GYNECOLOGY

## 2022-02-22 PROCEDURE — 85025 COMPLETE CBC W/AUTO DIFF WBC: CPT

## 2022-02-22 PROCEDURE — 76821 MIDDLE CEREBRAL ARTERY ECHO: CPT | Performed by: OBSTETRICS & GYNECOLOGY

## 2022-02-22 PROCEDURE — 1220000000 HC SEMI PRIVATE OB R&B

## 2022-02-22 RX ORDER — ONDANSETRON 2 MG/ML
INJECTION INTRAMUSCULAR; INTRAVENOUS
Status: COMPLETED
Start: 2022-02-22 | End: 2022-02-22

## 2022-02-22 RX ORDER — ONDANSETRON 2 MG/ML
4 INJECTION INTRAMUSCULAR; INTRAVENOUS EVERY 6 HOURS PRN
Status: DISCONTINUED | OUTPATIENT
Start: 2022-02-22 | End: 2022-02-26 | Stop reason: HOSPADM

## 2022-02-22 RX ADMIN — ONDANSETRON 4 MG: 2 INJECTION INTRAMUSCULAR; INTRAVENOUS at 18:05

## 2022-02-22 RX ADMIN — METRONIDAZOLE 500 MG: 500 TABLET ORAL at 08:36

## 2022-02-22 RX ADMIN — ERTAPENEM SODIUM 1000 MG: 1 INJECTION, POWDER, LYOPHILIZED, FOR SOLUTION INTRAMUSCULAR; INTRAVENOUS at 08:10

## 2022-02-22 RX ADMIN — SODIUM CHLORIDE, POTASSIUM CHLORIDE, SODIUM LACTATE AND CALCIUM CHLORIDE: 600; 310; 30; 20 INJECTION, SOLUTION INTRAVENOUS at 11:00

## 2022-02-22 RX ADMIN — SODIUM CHLORIDE, POTASSIUM CHLORIDE, SODIUM LACTATE AND CALCIUM CHLORIDE: 600; 310; 30; 20 INJECTION, SOLUTION INTRAVENOUS at 19:25

## 2022-02-22 RX ADMIN — ACETAMINOPHEN 650 MG: 325 TABLET ORAL at 19:34

## 2022-02-22 RX ADMIN — ACETAMINOPHEN 650 MG: 325 TABLET ORAL at 08:13

## 2022-02-22 ASSESSMENT — PAIN SCALES - GENERAL: PAINLEVEL_OUTOF10: 8

## 2022-02-22 NOTE — PROGRESS NOTES
Maternal Fetal Medicine update -  2/21/22    Pt seen , scanned yesterday and today  Reviewed with Dr Rosette Rebollar-    Clinically - no uterine cramps or tenderness  Baby active, non tachycardic  No Srom , no vag bleeding  No dyspnea nor cough  Profound constipation ( no BM aim 2 weeks)   No elevation of WBCs   Mild temp - feeling better   Neg hepatitis panel  Neg HIV/neg RPR / Neg GC/CT on prenatal labs-   Pending EB V/ CMV     Will follow - anticipate improvement shortly   Adding ASA QD     Selena Vieyra MD

## 2022-02-22 NOTE — PROGRESS NOTES
Department of Obstetrics and Gynecology  Labor and Delivery   Attending Progress Note      SUBJECTIVE:  Pt just vomited when I came in room. Had been eating soup and had nausea, vomited. She is complaining of RUQ pain. Good FM, no LOF,VB or contractions. OBJECTIVE:  No contractions  Pt appears uncomfortable and tender RUQ  LE without edema. ASSESSMENT & PLAN:    IUP at 22w1d  Elevated LFT's with RUQ pain, nausea and vomiting. Fever of unknown origin- last elevated 2/21/22 at 2000. GBUS inconclusive  Continue abx and antiemetics as needed.           Konstantin Kellogg, DO

## 2022-02-22 NOTE — PROGRESS NOTES
[address] [place] [address] [facility]  Progress Note      Chief Complaint   Patient presents with    Migraine     seen thursday for same thing    Fever     starting tuesday states its 104    Vaginal Discharge       SUBJECTIVE:  Patient is tolerating medications. No reported adverse drug reactions. No nausea, vomiting, diarrhea. Some difficulty with the metronidazole. LFTs are going up slightly and platelets are coming down. Temperature to 101 last night with rigors  Did have a couple bowel movements today more loose than 400  Her description of abdominal pain has varied yesterday was more diffuse now today its left lower quadrant. Fetus is stable according to nursing at the OB/GYN department  Review of systems:  As stated above in the chief complaint, otherwise negative. Medications:  Scheduled Meds:   polyethylene glycol  17 g Oral Daily    ertapenem (INVanz) IVPB  1,000 mg IntraVENous Q24H     Continuous Infusions:   lactated ringers 125 mL/hr at 22 0837     PRN Meds:acetaminophen    OBJECTIVE:  BP (!) 125/58   Pulse 106   Temp 98.9 °F (37.2 °C) (Oral)   Resp 16   Ht 5' 2\" (1.575 m)   Wt 222 lb (100.7 kg)   LMP 2021 (Exact Date)   SpO2 97%   BMI 40.60 kg/m²   Temp  Av.6 °F (37.6 °C)  Min: 98.8 °F (37.1 °C)  Max: 101.2 °F (38.4 °C)  Constitutional: The patient is awake, alert, and oriented. Skin: Warm and dry. No rashes were noted. HEENT: Round and reactive pupils. Moist mucous membranes. No ulcerations or thrush. Neck: Supple to movements. Chest: No use of accessory muscles to breathe. Symmetrical expansion. No wheezing, crackles or rhonchi. Cardiovascular: S1 and S2 are rhythmic and regular. No murmurs appreciated. Abdomen: Positive bowel sounds to auscultation. Benign to palpation. No masses felt. No hepatosplenomegaly.   5 months gravid abdomen with left lower quadrant pain as well as localized right upper quadrant pain and positive Janas Shouts sign  Genitourinary: Female  Extremities: No clubbing, no cyanosis, no edema. Lines: peripheral    Laboratory and Tests Review:  Lab Results   Component Value Date    WBC 5.2 02/21/2022    WBC 5.3 02/20/2022    WBC 6.0 02/20/2022    HGB 10.0 (L) 02/21/2022    HCT 30.5 (L) 02/21/2022    MCV 94.7 02/21/2022     02/21/2022     Lab Results   Component Value Date    NEUTROABS 5.09 02/20/2022    NEUTROABS 5.94 02/19/2022    NEUTROABS 8.15 (H) 02/17/2022     No results found for: CRPHS  Lab Results   Component Value Date    ALT 65 (H) 02/21/2022    AST 78 (H) 02/21/2022    ALKPHOS 67 02/21/2022    BILITOT 0.2 02/21/2022     Lab Results   Component Value Date     02/21/2022    K 3.6 02/21/2022    K 3.4 02/19/2022     02/21/2022    CO2 20 02/21/2022    BUN 4 02/21/2022    CREATININE 0.4 02/21/2022    CREATININE 0.5 02/20/2022    CREATININE 0.4 02/20/2022    GFRAA >60 02/21/2022    LABGLOM >60 02/21/2022    GLUCOSE 93 02/21/2022    PROT 5.7 02/21/2022    LABALBU 2.8 02/21/2022    CALCIUM 8.1 02/21/2022    BILITOT 0.2 02/21/2022    ALKPHOS 67 02/21/2022    AST 78 02/21/2022    ALT 65 02/21/2022     No results found for: CRP  No results found for: Matilda Lee  Radiology:   We will check ultrasound gallbladder today    Microbiology:   Lab Results   Component Value Date    BC 24 Hours no growth 02/19/2022    BC 24 Hours no growth 02/17/2022     Lab Results   Component Value Date    BLOODCULT2 24 Hours no growth 02/19/2022    BLOODCULT2 24 Hours no growth 02/17/2022     No results found for: WNDABS  No results found for: RESPSMEAR  No results found for: MPNEUMO, CLAMYDCU, LABLEGI, AFBCX, FUNGSM, LABFUNG  No results found for: CULTRESP  No results found for: CXCATHTIP  No results found for: BFCS  No results found for: CXSURG  Urine Culture, Routine   Date Value Ref Range Status   02/19/2022 <10,000 CFU/mL  Mixed gram positive organisms    Final     No results found for: [facility]AC      Assessment:  · Left lower quadrant pain the patient has been obstipated for about a week and had an ultrasound that effectively ruled out renal lithiasis although she has bilateral hydronephrosis.     · Gardnerella vaginitis-seems to be improving  · Suspect chorioendometritis especially with the LFT slightly going up as well as platelets coming down and the pain becoming more diffuse   · Rule out translocation from the obstipation and constipation from the left lower quadrant  · New right upper quadrant pain positive Soto sign along with the left lower quadrant pain     Plan:    · Cont Invanz  · OB/GYN to address the Gardnerella  · Discussed with OB/GYN nursing  · Ultrasound of the gallbladder  · Follow-up clinically especially for the possibility evolution of chorio- endometritis  · Check cultures  · Baseline ESR, CRP  · Monitor labs-check a CBC and differential today with a complete metabolic  · Will follow with you        Phyllis Rachel MD  10:39 AM  2/22/2022

## 2022-02-22 NOTE — PROGRESS NOTES
Patient sleeping comfortably in bed at this time. Oral and axillary temperature taken. Patient reports feeling \"okay\" at this time, breathing unlabored, FHTs 162.  Denies any complaints or need at this time

## 2022-02-23 ENCOUNTER — ANCILLARY PROCEDURE (OUTPATIENT)
Dept: OBGYN CLINIC | Age: 32
DRG: 832 | End: 2022-02-23
Payer: COMMERCIAL

## 2022-02-23 ENCOUNTER — APPOINTMENT (OUTPATIENT)
Dept: MRI IMAGING | Age: 32
DRG: 832 | End: 2022-02-23
Payer: COMMERCIAL

## 2022-02-23 LAB
ALBUMIN SERPL-MCNC: 2.8 G/DL (ref 3.5–5.2)
ALP BLD-CCNC: 85 U/L (ref 35–104)
ALT SERPL-CCNC: 133 U/L (ref 0–32)
ANION GAP SERPL CALCULATED.3IONS-SCNC: 13 MMOL/L (ref 7–16)
AST SERPL-CCNC: 158 U/L (ref 0–31)
BILIRUB SERPL-MCNC: 0.2 MG/DL (ref 0–1.2)
BUN BLDV-MCNC: <2 MG/DL (ref 6–20)
CALCIUM SERPL-MCNC: 7.9 MG/DL (ref 8.6–10.2)
CHLORIDE BLD-SCNC: 100 MMOL/L (ref 98–107)
CO2: 22 MMOL/L (ref 22–29)
CREAT SERPL-MCNC: 0.4 MG/DL (ref 0.5–1)
CYTOMEGALOVIRUS IGG ANTIBODY: NORMAL
GFR AFRICAN AMERICAN: >60
GFR NON-AFRICAN AMERICAN: >60 ML/MIN/1.73
GLUCOSE BLD-MCNC: 139 MG/DL (ref 74–99)
POTASSIUM SERPL-SCNC: 3.4 MMOL/L (ref 3.5–5)
SODIUM BLD-SCNC: 135 MMOL/L (ref 132–146)
TOTAL PROTEIN: 5.4 G/DL (ref 6.4–8.3)

## 2022-02-23 PROCEDURE — 76820 UMBILICAL ARTERY ECHO: CPT | Performed by: OBSTETRICS & GYNECOLOGY

## 2022-02-23 PROCEDURE — 36415 COLL VENOUS BLD VENIPUNCTURE: CPT

## 2022-02-23 PROCEDURE — 86695 HERPES SIMPLEX TYPE 1 TEST: CPT

## 2022-02-23 PROCEDURE — 80053 COMPREHEN METABOLIC PANEL: CPT

## 2022-02-23 PROCEDURE — 87591 N.GONORRHOEAE DNA AMP PROB: CPT

## 2022-02-23 PROCEDURE — 86696 HERPES SIMPLEX TYPE 2 TEST: CPT

## 2022-02-23 PROCEDURE — 6370000000 HC RX 637 (ALT 250 FOR IP): Performed by: OBSTETRICS & GYNECOLOGY

## 2022-02-23 PROCEDURE — 76819 FETAL BIOPHYS PROFIL W/O NST: CPT | Performed by: OBSTETRICS & GYNECOLOGY

## 2022-02-23 PROCEDURE — 2580000003 HC RX 258: Performed by: OBSTETRICS & GYNECOLOGY

## 2022-02-23 PROCEDURE — 87491 CHLMYD TRACH DNA AMP PROBE: CPT

## 2022-02-23 PROCEDURE — 76815 OB US LIMITED FETUS(S): CPT | Performed by: OBSTETRICS & GYNECOLOGY

## 2022-02-23 PROCEDURE — 1220000000 HC SEMI PRIVATE OB R&B

## 2022-02-23 PROCEDURE — 6360000002 HC RX W HCPCS: Performed by: SPECIALIST

## 2022-02-23 PROCEDURE — 86694 HERPES SIMPLEX NES ANTBDY: CPT

## 2022-02-23 PROCEDURE — 74181 MRI ABDOMEN W/O CONTRAST: CPT

## 2022-02-23 PROCEDURE — 76821 MIDDLE CEREBRAL ARTERY ECHO: CPT | Performed by: OBSTETRICS & GYNECOLOGY

## 2022-02-23 PROCEDURE — 2580000003 HC RX 258: Performed by: SPECIALIST

## 2022-02-23 PROCEDURE — 6360000002 HC RX W HCPCS: Performed by: OBSTETRICS & GYNECOLOGY

## 2022-02-23 RX ADMIN — SODIUM CHLORIDE, POTASSIUM CHLORIDE, SODIUM LACTATE AND CALCIUM CHLORIDE: 600; 310; 30; 20 INJECTION, SOLUTION INTRAVENOUS at 22:54

## 2022-02-23 RX ADMIN — ACETAMINOPHEN 650 MG: 325 TABLET ORAL at 03:32

## 2022-02-23 RX ADMIN — ONDANSETRON 4 MG: 2 INJECTION INTRAMUSCULAR; INTRAVENOUS at 03:03

## 2022-02-23 RX ADMIN — ONDANSETRON 4 MG: 2 INJECTION INTRAMUSCULAR; INTRAVENOUS at 09:17

## 2022-02-23 RX ADMIN — ONDANSETRON 4 MG: 2 INJECTION INTRAMUSCULAR; INTRAVENOUS at 17:07

## 2022-02-23 RX ADMIN — ACETAMINOPHEN 650 MG: 325 TABLET ORAL at 14:37

## 2022-02-23 RX ADMIN — POLYETHYLENE GLYCOL 3350 17 G: 17 POWDER, FOR SOLUTION ORAL at 08:29

## 2022-02-23 RX ADMIN — ACETAMINOPHEN 650 MG: 325 TABLET ORAL at 22:51

## 2022-02-23 RX ADMIN — ERTAPENEM SODIUM 1000 MG: 1 INJECTION, POWDER, LYOPHILIZED, FOR SOLUTION INTRAMUSCULAR; INTRAVENOUS at 08:29

## 2022-02-23 ASSESSMENT — PAIN DESCRIPTION - PROGRESSION: CLINICAL_PROGRESSION: NOT CHANGED

## 2022-02-23 ASSESSMENT — PAIN - FUNCTIONAL ASSESSMENT: PAIN_FUNCTIONAL_ASSESSMENT: ACTIVITIES ARE NOT PREVENTED

## 2022-02-23 ASSESSMENT — PAIN DESCRIPTION - DESCRIPTORS: DESCRIPTORS: CRAMPING

## 2022-02-23 ASSESSMENT — PAIN SCALES - GENERAL
PAINLEVEL_OUTOF10: 0
PAINLEVEL_OUTOF10: 7
PAINLEVEL_OUTOF10: 3

## 2022-02-23 ASSESSMENT — PAIN DESCRIPTION - PAIN TYPE: TYPE: ACUTE PAIN

## 2022-02-23 ASSESSMENT — PAIN DESCRIPTION - ONSET: ONSET: ON-GOING

## 2022-02-23 ASSESSMENT — PAIN DESCRIPTION - ORIENTATION: ORIENTATION: UPPER

## 2022-02-23 ASSESSMENT — PAIN DESCRIPTION - FREQUENCY: FREQUENCY: CONTINUOUS

## 2022-02-23 ASSESSMENT — PAIN DESCRIPTION - LOCATION: LOCATION: ABDOMEN

## 2022-02-23 NOTE — CONSULTS
GENERAL SURGERY  CONSULT NOTE  2022    Physician Consulted: Dr. Antonio Bay  Reason for Consult: Eval for cholecystitis  Referring Physician: Dr. June Earl    GIO Stark is a 32 y.o. female who is 22 weeks pregnant. Patient has been having about 8 days of high fevers. Originally last week her fevers were as high as 104 and have been waxing and waning. She is also having left lower quadrant pain originally and was reportedly constipated. With the bowel movement and good bowel regimen his pain is now resolved. She has still been spiking fevers and infectious disease team has been consulted. About 3 days ago she started having severe epigastric/right upper quadrant pain. She feels that this wraps around to her back on her rib cage. Patient is also been having significant nausea and vomiting. She is also reportedly having diarrhea. Right upper quadrant ultrasound shows gallbladder wall thickening and some fluid around the gallbladder. No white count but mildly elevated liver enzymes. Alk phos normal.  Bilirubin normal.      Past Medical History:   Diagnosis Date    Missed      Pregnancy     21-7 weeks    Rectal bleed        Past Surgical History:   Procedure Laterality Date    DILATION AND CURETTAGE OF UTERUS  2021    LAPAROSCOPY  2014    WISDOM TOOTH EXTRACTION         Medications Prior to Admission:    Prior to Admission medications    Medication Sig Start Date End Date Taking?  Authorizing Provider   acetaminophen (TYLENOL) 500 MG tablet Take 1 tablet by mouth 4 times daily as needed for Pain 22  Yes Lor Dariusz, DO   doxylamine-pyridoxine 10-10 MG TBEC Take 1 tablet by mouth 2 times daily for 10 days If needed for nausea/vomiting 22 Yes Lor Arzola, DO   docusate sodium (COLACE) 100 MG capsule Take 100 mg by mouth daily   Yes Historical Provider, MD   Prenatal Vit-Fe Fumarate-FA (PRENATAL VITAMIN PO) Take by mouth   Yes Historical Provider, MD       Allergies   Allergen Reactions    Latex Hives and Itching    Penicillins Hives       Family History   Problem Relation Age of Onset    Other Cancer Mother        Social History     Tobacco Use    Smoking status: Never Smoker    Smokeless tobacco: Never Used   Vaping Use    Vaping Use: Never used   Substance Use Topics    Alcohol use: Not Currently    Drug use: Never         Review of Systems   General ROS: positive for  - fever  Hematological and Lymphatic ROS: negative  Respiratory ROS: no cough, shortness of breath, or wheezing  Cardiovascular ROS: no chest pain or dyspnea on exertion  Gastrointestinal ROS: Abdominal pain, nausea vomiting  Genito-Urinary ROS: no dysuria, trouble voiding, or hematuria  Musculoskeletal ROS: negative      PHYSICAL EXAM:    Vitals:    02/23/22 1434   BP:    Pulse:    Resp:    Temp: 101.5 °F (38.6 °C)   SpO2:        General Appearance:  in moderate distress  Skin:  Skin color, texture, turgor normal. No rashes or lesions. Head/face:  NCAT  Eyes:  No gross abnormalities. and Sclera nonicteric  Lungs:  Breathing Pattern: regular, no distress  Heart:  Heart regular rate and rhythm  Abdomen:  Soft, tender to palpation right upper quadrant, Soto sign positive  Extremities: Extremities warm to touch, pink, with no edema. LABS:    CBC  Recent Labs     02/22/22  1115   WBC 5.0   HGB 10.5*   HCT 31.8*        BMP  Recent Labs     02/23/22  0945      K 3.4*      CO2 22   BUN <2*   CREATININE 0.4*   CALCIUM 7.9*     Liver Function  Recent Labs     02/23/22  0945   BILITOT 0.2   *   *   ALKPHOS 85   PROT 5.4*   LABALBU 2.8*     No results for input(s): LACTATE in the last 72 hours. No results for input(s): INR, PTT in the last 72 hours.     Invalid input(s): PT    RADIOLOGY    US OB 1 OR MORE FETUS LIMITED    Result Date: 2/19/2022  EXAMINATION: TRIMESTER OBSTETRIC ULTRASOUND 2/19/2022 TECHNIQUE: Superficial sonographic evaluation of the gravid uterus. COMPARISON: None available HISTORY: ORDERING SYSTEM PROVIDED HISTORY: 21.5 weeks pregnant abdominal pain TECHNOLOGIST PROVIDED HISTORY: Reason for exam:->21.5 weeks pregnant abdominal pain What reading provider will be dictating this exam?->CRC FINDINGS: A single live intrauterine pregnancy is present. Fetal heart rate measures 141 beats per minute. There is normal fetal body and limb movement. The fetus is in breech position. The placenta is located posterior and to the right with no evidence of previa. The amniotic fluid volume is visually normal. Cervix is closed measuring 5.1 cm. BPD measures 3.29 cm. 22 weeks and 0 days. Head circumference measures 19.79 cm. 22 weeks and 0 days. Abdominal circumference measures 17.59 cm. 22 weeks and 3 days. Femur length measures 3.77 cm. 22 weeks and 0 days. Estimated fetal weight is 488.47 grams which correlates to 34th percentile based upon last menstrual period. Estimated gestational age by current ultrasound is 22 weeks and 1 day Estimated date of delivery based on current ultrasound: June 24, 2022 Estimated gestational age based on clinical age provided: 25 weeks and 3 days Estimated date of delivery based on clinical age provided: June 22, 2022     Single live intrauterine pregnancy with gestational age of 25 weeks and 1 day by current sonographic biometry which is concordant to the clinical age provided of 25 weeks and 3 days. The estimated due date based on current ultrasound is June 24, 2022. Breech presentation with no evidence of previa. Visually normal amniotic fluid. Cervix is closed. Fetal heart rate was 141 beats per minute. Estimated fetal weight was 488 g.  RECOMMENDATIONS: Recommend scheduling patient for a complete anatomic survey at this time     US RETROPERITONEAL COMPLETE    Result Date: 2/20/2022  EXAMINATION: RETROPERITONEAL ULTRASOUND OF THE KIDNEYS AND URINARY BLADDER 2/20/2022 COMPARISON: None HISTORY: [address] Gillespie PROVIDED HISTORY: abdominal pain TECHNOLOGIST PROVIDED HISTORY: Reason for exam:->abdominal pain What reading provider will be dictating this exam?->CRC FINDINGS: Kidneys: The right kidney measures 11.7 cm in length and the left kidney measures 12.0 cm in length. Corticomedullary differentiation and cortical thickness is preserved. No renal mass, renal cysts, nor intrarenal calcification. Mild bilateral hydronephrosis. Bladder: The bladder was not distended for this exam and could not be evaluated. Gravid uterus. Appears in breech presentation. 1.  Mild bilateral hydronephrosis. Otherwise normal appearance of the kidneys. 2.  Bladder not well distended for this exam and could not be evaluated. ASSESSMENT:  32 y.o. female with fevers of unknown origin and transaminitis. Right upper quadrant ultrasound did not show any stones or sludge, but did show mild wall thickening and pericholecystic fluid. Mixed picture. PLAN:    Antibiotics per infectious disease team  Follow-up cultures  Keep hydrated  Antinausea medications    MRCP to evaluate biliary system    Electronically signed by Fausto Church MD on 2/23/22 at 6:15 PM EST      ATTENDING PHYSICIAN PROGRESS NOTE      I have examined the patient, reviewed the record, and discussed the case with the Resident. I have reviewed all relevant labs and imaging data. Please refer to the resident's note. I agree with the assessment and plan with the following corrections/ additions. The following summarizes my clinical findings and independent assessment.      Prachi Napier MD

## 2022-02-23 NOTE — PROGRESS NOTES
MRI called and stated they are unable to perform MRI with contrast d/t pregnancy but they will plan to do it without contrast towards the end of the night. States to keep pt NPO until scan.

## 2022-02-23 NOTE — PROGRESS NOTES
RN at bedside. Patient resting in bed with complaints of upper abdominal cramping and tenderness. VSS, afebrile. FHTs 145 on doppler.

## 2022-02-23 NOTE — PROGRESS NOTES
Department of Obstetrics and Gynecology  Labor and Delivery   Attending Progress Note      SUBJECTIVE:  Pt states epigastric and RUQ pain. Vomited twice over night. Poor appetite, nausea. Pos FM, no LOF,VB or reg ctxs. OBJECTIVE:  Tmax 100.8  Abdomen tender to palpation epigastric and RUQ. Fundus nontender, LLQ minimally tender to palpation. LE without calf tenderness.     Fetal heart rate:       Baseline Heart Rate:  Spot check 140's          Membranes:  Intact    Cervix:    N/E      ASSESSMENT & PLAN:    IUP at 22w2d  Fever of unknown origin   RUQ pain-GBUS inconclusive          Catracho Amor DO

## 2022-02-24 LAB
EPSTEIN-BARR VCA IGG: 58.3 U/ML (ref 0–21.9)
EPSTEIN-BARR VCA IGM: <10 U/ML (ref 0–43.9)

## 2022-02-24 PROCEDURE — 6370000000 HC RX 637 (ALT 250 FOR IP): Performed by: SPECIALIST

## 2022-02-24 PROCEDURE — 6370000000 HC RX 637 (ALT 250 FOR IP): Performed by: OBSTETRICS & GYNECOLOGY

## 2022-02-24 PROCEDURE — 6360000002 HC RX W HCPCS: Performed by: OBSTETRICS & GYNECOLOGY

## 2022-02-24 PROCEDURE — 6360000002 HC RX W HCPCS: Performed by: SPECIALIST

## 2022-02-24 PROCEDURE — 36415 COLL VENOUS BLD VENIPUNCTURE: CPT

## 2022-02-24 PROCEDURE — 2580000003 HC RX 258: Performed by: SPECIALIST

## 2022-02-24 PROCEDURE — 86703 HIV-1/HIV-2 1 RESULT ANTBDY: CPT

## 2022-02-24 PROCEDURE — 86592 SYPHILIS TEST NON-TREP QUAL: CPT

## 2022-02-24 PROCEDURE — 1220000000 HC SEMI PRIVATE OB R&B

## 2022-02-24 PROCEDURE — 99252 IP/OBS CONSLTJ NEW/EST SF 35: CPT | Performed by: OBSTETRICS & GYNECOLOGY

## 2022-02-24 PROCEDURE — 87799 DETECT AGENT NOS DNA QUANT: CPT

## 2022-02-24 RX ORDER — AZITHROMYCIN 250 MG/1
1000 TABLET, FILM COATED ORAL ONCE
Status: COMPLETED | OUTPATIENT
Start: 2022-02-24 | End: 2022-02-24

## 2022-02-24 RX ADMIN — ONDANSETRON 4 MG: 2 INJECTION INTRAMUSCULAR; INTRAVENOUS at 23:40

## 2022-02-24 RX ADMIN — ACETAMINOPHEN 650 MG: 325 TABLET ORAL at 04:41

## 2022-02-24 RX ADMIN — ACETAMINOPHEN 650 MG: 325 TABLET ORAL at 22:30

## 2022-02-24 RX ADMIN — AZITHROMYCIN 1000 MG: 250 TABLET, FILM COATED ORAL at 17:29

## 2022-02-24 RX ADMIN — ACETAMINOPHEN 650 MG: 325 TABLET ORAL at 17:10

## 2022-02-24 RX ADMIN — ERTAPENEM SODIUM 1000 MG: 1 INJECTION, POWDER, LYOPHILIZED, FOR SOLUTION INTRAMUSCULAR; INTRAVENOUS at 08:30

## 2022-02-24 RX ADMIN — ACYCLOVIR SODIUM 250 MG: 500 INJECTION, SOLUTION INTRAVENOUS at 17:29

## 2022-02-24 RX ADMIN — ONDANSETRON 4 MG: 2 INJECTION INTRAMUSCULAR; INTRAVENOUS at 08:35

## 2022-02-24 ASSESSMENT — PAIN SCALES - GENERAL
PAINLEVEL_OUTOF10: 5
PAINLEVEL_OUTOF10: 9
PAINLEVEL_OUTOF10: 3

## 2022-02-24 NOTE — PROGRESS NOTES
MRI called and states patient will be able to come down for MRI, iv fluids disconnected at this time.

## 2022-02-24 NOTE — PROGRESS NOTES
Assumed care of patient, patient in bed,  at bedside, states would like to speak to the nurse practitioner, would not say what about, states abdominal pain in the RUQ, denies lof or vb, states positive fetal movement, fhts assessed and in the 160s, patient NPO, denies any needs at this time.

## 2022-02-24 NOTE — PROGRESS NOTES
Inquired about what patient wanted to speak to the nurse practitioner or the doctor on call about, patient states to speak to her . The  admits to being treated for what he thought was a bad UTI, come to find out it is herpes and gonorrhea. Dr. Delena Phalen made aware, states to relay information to ID and new orders for urine and blood work.

## 2022-02-24 NOTE — PROGRESS NOTES
[address] [place] [address] [facility]  Progress Note      Chief Complaint   Patient presents with    Migraine     seen thursday for same thing    Fever     starting tuesday states its 104    Vaginal Discharge       SUBJECTIVE:  Patient is tolerating medications. No reported adverse drug reactions. No nausea, vomiting, diarrhea. Some difficulty with the metronidazole. LFTs are going up slightly and platelets are coming down. Afebrile  Right upper quadrant   LFTs are still rising  Revelation about 's infidelity and STD exposure noted-discussed with patient  Review of systems:  As stated above in the chief complaint, otherwise negative. Medications:  Scheduled Meds:   acyclovir  5 mg/kg (Ideal) IntraVENous Q8H    azithromycin  1,000 mg Oral Once    polyethylene glycol  17 g Oral Daily    ertapenem (INVanz) IVPB  1,000 mg IntraVENous Q24H     Continuous Infusions:   lactated ringers 125 mL/hr at 22 2254     PRN Meds:ondansetron, acetaminophen    OBJECTIVE:  /86   Pulse 116   Temp 99.1 °F (37.3 °C) (Oral)   Resp 16   Ht 5' 2\" (1.575 m)   Wt 222 lb (100.7 kg)   LMP 2021 (Exact Date)   SpO2 97%   BMI 40.60 kg/m²   Temp  Av.1 °F (37.3 °C)  Min: 98 °F (36.7 °C)  Max: 100.6 °F (38.1 °C)  Constitutional: The patient is awake, alert, and oriented. Skin: Warm and dry. No rashes were noted. HEENT: Round and reactive pupils. Moist mucous membranes. No ulcerations or thrush. Neck: Supple to movements. Chest: No use of accessory muscles to breathe. Symmetrical expansion. No wheezing, crackles or rhonchi. Cardiovascular: S1 and S2 are rhythmic and regular. No murmurs appreciated. Abdomen: Positive bowel sounds to auscultation. Benign to palpation. No masses felt. No hepatosplenomegaly.   5 months gravid abdomen with left lower quadrant pain as well as localized right upper quadrant pain and positive Soto sign  Genitourinary: Female  Extremities: No clubbing, no cyanosis, no edema. Lines: peripheral    Laboratory and Tests Review:  Lab Results   Component Value Date    WBC 5.0 02/22/2022    WBC 5.2 02/21/2022    WBC 5.3 02/20/2022    HGB 10.5 (L) 02/22/2022    HCT 31.8 (L) 02/22/2022    MCV 95.2 02/22/2022     02/22/2022     Lab Results   Component Value Date    NEUTROABS 4.55 02/22/2022    NEUTROABS 5.09 02/20/2022    NEUTROABS 5.94 02/19/2022     No results found for: CRPHS  Lab Results   Component Value Date     (H) 02/23/2022     (H) 02/23/2022    ALKPHOS 85 02/23/2022    BILITOT 0.2 02/23/2022     Lab Results   Component Value Date     02/23/2022    K 3.4 02/23/2022    K 3.4 02/19/2022     02/23/2022    CO2 22 02/23/2022    BUN <2 02/23/2022    CREATININE 0.4 02/23/2022    CREATININE 0.4 02/22/2022    CREATININE 0.4 02/21/2022    GFRAA >60 02/23/2022    LABGLOM >60 02/23/2022    GLUCOSE 139 02/23/2022    PROT 5.4 02/23/2022    LABALBU 2.8 02/23/2022    CALCIUM 7.9 02/23/2022    BILITOT 0.2 02/23/2022    ALKPHOS 85 02/23/2022     02/23/2022     02/23/2022     No results found for: CRP  No results found for: Arthurine Beau  Radiology:   We will check ultrasound gallbladder today    Microbiology:   Lab Results   Component Value Date    BC 24 Hours no growth 02/19/2022    BC 5 Days no growth 02/17/2022     Lab Results   Component Value Date    BLOODCULT2 24 Hours no growth 02/19/2022    BLOODCULT2 5 Days no growth 02/17/2022     No results found for: WNDABS  No results found for: RESPSMEAR  No results found for: MPNEUMO, CLAMYDCU, LABLEGI, AFBCX, FUNGSM, LABFUNG  No results found for: CULTRESP  No results found for: CXCATHTIP  No results found for: BFCS  No results found for: CXSURG  Urine Culture, Routine   Date Value Ref Range Status   02/19/2022 <10,000 CFU/mL  Mixed gram positive organisms    Final     No results found for: MRSAC      Assessment:  · Left lower quadrant pain the patient has been obstipated for about a week and had an ultrasound that effectively ruled out renal lithiasis although she has bilateral hydronephrosis. · Gardnerella vaginitis-seems to be improving  · Suspect chorioendometritis especially with the LFT slightly going up as well as platelets coming down and the pain becoming more diffuse   · Rule out translocation from the obstipation and constipation from the left lower quadrant  · New right upper quadrant pain positive Soto sign along with the left lower quadrant pain  · STD exposure     Plan:    · Cont Invanz  · OB/GYN to address the Gardnerella  · 1 dose of azithromycin and start acyclovir;  Masood Gauthier will  the rest  · RPR; GC and Chlamydia was sent  · HIV  · EBV viral load and CMV viral load  · Follow-up clinically especially for the possibility evolution of chorio- endometritis  · Check cultures  · Plus with OB/GYN nursing  · Monitor labs-check a CBC and differential today with a complete metabolic  · Will follow with you        Ebony Angelucci, MD  4:37 PM  2/24/2022

## 2022-02-24 NOTE — PROGRESS NOTES
GENERAL SURGERY  DAILY PROGRESS NOTE  2/24/2022    Chief Complaint   Patient presents with    Migraine     seen thursday for same thing    Fever     starting tuesday states its 104    Vaginal Discharge       Subjective:  Seen and examined this morning, still having moderate to severe right upper quadrant pain. Some nausea but no vomiting. MRI was performed yesterday. She did also mention that her /partner was just recently confirmed to have chlamydia and herpes diagnosed. She was afebrile overnight. Objective:  /70   Pulse 96   Temp 98 °F (36.7 °C) (Oral)   Resp 16   Ht 5' 2\" (1.575 m)   Wt 222 lb (100.7 kg)   LMP 09/20/2021 (Exact Date)   SpO2 97%   BMI 40.60 kg/m²     General Appearance:  no acute distress laying in bed  Skin:  Skin color, texture, turgor normal. No rashes or lesions. Head/face:  NCAT  Eyes:  No gross abnormalities. and Sclera nonicteric  Lungs:  Breathing Pattern: regular, no distress  Heart:  Heart regular rate and rhythm  Abdomen:  Soft, tender to palpation right upper quadrant, gravid uterus. Extremities: Extremities warm to touch, pink, with no edema. Assessment/Plan:  32 y.o. female with fevers of unknown origin and transaminitis. Right upper quadrant ultrasound did not show any stones or sludge, but did show mild wall thickening and pericholecystic fluid. Mixed picture. Continue IV antibiotics per infectious disease  In the setting of no acute definitive cholecystitis per multiple imaging risk of surgery outweighs nonoperative treatment at this time. Moreover, patient has a mixed infectious picture unfortunately with the exposure to chlamydia and herpes. Patient does not have HIV test in our system would recommend at this time for FUO work-up. Okay for regular diet  Pain and nausea control per primary.     Electronically signed by Camille Nguyen MD on 2/24/2022 at 9:43 AM

## 2022-02-25 LAB
ALBUMIN SERPL-MCNC: 2.8 G/DL (ref 3.5–5.2)
ALP BLD-CCNC: 98 U/L (ref 35–104)
ALT SERPL-CCNC: 99 U/L (ref 0–32)
ANION GAP SERPL CALCULATED.3IONS-SCNC: 12 MMOL/L (ref 7–16)
AST SERPL-CCNC: 78 U/L (ref 0–31)
ATYPICAL LYMPHOCYTE RELATIVE PERCENT: 0.9 % (ref 0–4)
BASOPHILS ABSOLUTE: 0.06 E9/L (ref 0–0.2)
BASOPHILS RELATIVE PERCENT: 0.9 % (ref 0–2)
BILIRUB SERPL-MCNC: 0.2 MG/DL (ref 0–1.2)
BLOOD CULTURE, ROUTINE: NORMAL
BUN BLDV-MCNC: 2 MG/DL (ref 6–20)
BURR CELLS: ABNORMAL
CALCIUM SERPL-MCNC: 8.5 MG/DL (ref 8.6–10.2)
CHLORIDE BLD-SCNC: 102 MMOL/L (ref 98–107)
CO2: 24 MMOL/L (ref 22–29)
CREAT SERPL-MCNC: 0.4 MG/DL (ref 0.5–1)
CULTURE, BLOOD 2: NORMAL
EOSINOPHILS ABSOLUTE: 0.18 E9/L (ref 0.05–0.5)
EOSINOPHILS RELATIVE PERCENT: 2.7 % (ref 0–6)
GFR AFRICAN AMERICAN: >60
GFR NON-AFRICAN AMERICAN: >60 ML/MIN/1.73
GLUCOSE BLD-MCNC: 97 MG/DL (ref 74–99)
HCT VFR BLD CALC: 30.2 % (ref 34–48)
HEMOGLOBIN: 9.8 G/DL (ref 11.5–15.5)
HIV-1 AND HIV-2 ANTIBODIES: NORMAL
LYMPHOCYTES ABSOLUTE: 1.04 E9/L (ref 1.5–4)
LYMPHOCYTES RELATIVE PERCENT: 15.3 % (ref 20–42)
MCH RBC QN AUTO: 31.1 PG (ref 26–35)
MCHC RBC AUTO-ENTMCNC: 32.5 % (ref 32–34.5)
MCV RBC AUTO: 95.9 FL (ref 80–99.9)
METAMYELOCYTES RELATIVE PERCENT: 2.7 % (ref 0–1)
MONOCYTES ABSOLUTE: 0.2 E9/L (ref 0.1–0.95)
MONOCYTES RELATIVE PERCENT: 2.7 % (ref 2–12)
NEUTROPHILS ABSOLUTE: 5.07 E9/L (ref 1.8–7.3)
NEUTROPHILS RELATIVE PERCENT: 74.8 % (ref 43–80)
NUCLEATED RED BLOOD CELLS: 0.9 /100 WBC
OVALOCYTES: ABNORMAL
PDW BLD-RTO: 13 FL (ref 11.5–15)
PLATELET # BLD: 261 E9/L (ref 130–450)
PMV BLD AUTO: 9.5 FL (ref 7–12)
POIKILOCYTES: ABNORMAL
POLYCHROMASIA: ABNORMAL
POTASSIUM SERPL-SCNC: 3 MMOL/L (ref 3.5–5)
RBC # BLD: 3.15 E12/L (ref 3.5–5.5)
RPR: NORMAL
SODIUM BLD-SCNC: 138 MMOL/L (ref 132–146)
TOTAL PROTEIN: 6.2 G/DL (ref 6.4–8.3)
WBC # BLD: 6.5 E9/L (ref 4.5–11.5)

## 2022-02-25 PROCEDURE — 99232 SBSQ HOSP IP/OBS MODERATE 35: CPT | Performed by: OBSTETRICS & GYNECOLOGY

## 2022-02-25 PROCEDURE — 6370000000 HC RX 637 (ALT 250 FOR IP): Performed by: SPECIALIST

## 2022-02-25 PROCEDURE — 85025 COMPLETE CBC W/AUTO DIFF WBC: CPT

## 2022-02-25 PROCEDURE — 36410 VNPNXR 3YR/> PHY/QHP DX/THER: CPT

## 2022-02-25 PROCEDURE — 76937 US GUIDE VASCULAR ACCESS: CPT

## 2022-02-25 PROCEDURE — 2580000003 HC RX 258: Performed by: SPECIALIST

## 2022-02-25 PROCEDURE — 80053 COMPREHEN METABOLIC PANEL: CPT

## 2022-02-25 PROCEDURE — 2500000003 HC RX 250 WO HCPCS: Performed by: SPECIALIST

## 2022-02-25 PROCEDURE — C1751 CATH, INF, PER/CENT/MIDLINE: HCPCS

## 2022-02-25 PROCEDURE — 2580000003 HC RX 258: Performed by: OBSTETRICS & GYNECOLOGY

## 2022-02-25 PROCEDURE — 1220000000 HC SEMI PRIVATE OB R&B

## 2022-02-25 PROCEDURE — 6360000002 HC RX W HCPCS: Performed by: SPECIALIST

## 2022-02-25 PROCEDURE — 36415 COLL VENOUS BLD VENIPUNCTURE: CPT

## 2022-02-25 PROCEDURE — 6360000002 HC RX W HCPCS: Performed by: OBSTETRICS & GYNECOLOGY

## 2022-02-25 PROCEDURE — 6370000000 HC RX 637 (ALT 250 FOR IP): Performed by: OBSTETRICS & GYNECOLOGY

## 2022-02-25 PROCEDURE — 05HB33Z INSERTION OF INFUSION DEVICE INTO RIGHT BASILIC VEIN, PERCUTANEOUS APPROACH: ICD-10-PCS | Performed by: SPECIALIST

## 2022-02-25 RX ORDER — ACYCLOVIR 200 MG/1
400 CAPSULE ORAL 3 TIMES DAILY
Status: DISCONTINUED | OUTPATIENT
Start: 2022-02-25 | End: 2022-02-26 | Stop reason: HOSPADM

## 2022-02-25 RX ORDER — HEPARIN SODIUM (PORCINE) LOCK FLUSH IV SOLN 100 UNIT/ML 100 UNIT/ML
1 SOLUTION INTRAVENOUS EVERY 12 HOURS SCHEDULED
Status: DISCONTINUED | OUTPATIENT
Start: 2022-02-25 | End: 2022-02-26 | Stop reason: HOSPADM

## 2022-02-25 RX ORDER — HEPARIN SODIUM (PORCINE) LOCK FLUSH IV SOLN 100 UNIT/ML 100 UNIT/ML
1 SOLUTION INTRAVENOUS PRN
Status: DISCONTINUED | OUTPATIENT
Start: 2022-02-25 | End: 2022-02-26 | Stop reason: HOSPADM

## 2022-02-25 RX ORDER — POTASSIUM CHLORIDE 20 MEQ/1
40 TABLET, EXTENDED RELEASE ORAL ONCE
Status: COMPLETED | OUTPATIENT
Start: 2022-02-25 | End: 2022-02-25

## 2022-02-25 RX ORDER — SODIUM CHLORIDE 0.9 % (FLUSH) 0.9 %
5-40 SYRINGE (ML) INJECTION PRN
Status: DISCONTINUED | OUTPATIENT
Start: 2022-02-25 | End: 2022-02-26 | Stop reason: HOSPADM

## 2022-02-25 RX ORDER — SODIUM CHLORIDE 0.9 % (FLUSH) 0.9 %
5-40 SYRINGE (ML) INJECTION EVERY 12 HOURS SCHEDULED
Status: DISCONTINUED | OUTPATIENT
Start: 2022-02-25 | End: 2022-02-26 | Stop reason: HOSPADM

## 2022-02-25 RX ORDER — ACYCLOVIR 200 MG/1
400 CAPSULE ORAL 3 TIMES DAILY
Qty: 60 CAPSULE | Refills: 0 | Status: SHIPPED | OUTPATIENT
Start: 2022-02-25 | End: 2022-03-07

## 2022-02-25 RX ORDER — SODIUM CHLORIDE 9 MG/ML
25 INJECTION, SOLUTION INTRAVENOUS PRN
Status: DISCONTINUED | OUTPATIENT
Start: 2022-02-25 | End: 2022-02-26 | Stop reason: HOSPADM

## 2022-02-25 RX ADMIN — POTASSIUM CHLORIDE 40 MEQ: 20 TABLET, EXTENDED RELEASE ORAL at 15:04

## 2022-02-25 RX ADMIN — ONDANSETRON 4 MG: 2 INJECTION INTRAMUSCULAR; INTRAVENOUS at 06:50

## 2022-02-25 RX ADMIN — LIDOCAINE HYDROCHLORIDE 2 ML: 10 INJECTION, SOLUTION EPIDURAL; INFILTRATION; INTRACAUDAL; PERINEURAL at 17:40

## 2022-02-25 RX ADMIN — ACYCLOVIR 400 MG: 200 CAPSULE ORAL at 20:26

## 2022-02-25 RX ADMIN — ACETAMINOPHEN 650 MG: 325 TABLET ORAL at 06:50

## 2022-02-25 RX ADMIN — SODIUM CHLORIDE, PRESERVATIVE FREE 10 ML: 5 INJECTION INTRAVENOUS at 20:28

## 2022-02-25 RX ADMIN — HEPARIN SODIUM (PORCINE) LOCK FLUSH IV SOLN 100 UNIT/ML 100 UNITS: 100 SOLUTION at 20:27

## 2022-02-25 RX ADMIN — ERTAPENEM SODIUM 1000 MG: 1 INJECTION, POWDER, LYOPHILIZED, FOR SOLUTION INTRAMUSCULAR; INTRAVENOUS at 08:05

## 2022-02-25 RX ADMIN — ACYCLOVIR SODIUM 250 MG: 500 INJECTION, SOLUTION INTRAVENOUS at 09:53

## 2022-02-25 RX ADMIN — ACYCLOVIR SODIUM 250 MG: 500 INJECTION, SOLUTION INTRAVENOUS at 01:36

## 2022-02-25 RX ADMIN — ACYCLOVIR 400 MG: 200 CAPSULE ORAL at 16:16

## 2022-02-25 RX ADMIN — SODIUM CHLORIDE, POTASSIUM CHLORIDE, SODIUM LACTATE AND CALCIUM CHLORIDE: 600; 310; 30; 20 INJECTION, SOLUTION INTRAVENOUS at 01:39

## 2022-02-25 ASSESSMENT — PAIN SCALES - GENERAL
PAINLEVEL_OUTOF10: 0
PAINLEVEL_OUTOF10: 5

## 2022-02-25 ASSESSMENT — PAIN DESCRIPTION - DESCRIPTORS: DESCRIPTORS: CRAMPING;SORE

## 2022-02-25 NOTE — PROGRESS NOTES
Spoke with dr Vince Lang. Updated on ID recommendations by ID. Midline placement. Home health care set up. OK for discharge tonight if guaranteed IV therapy for weekend. If not able to guarantee IV therapy patient must stay until Monday. Will reach out to home health to inquire about therapy.

## 2022-02-25 NOTE — PROGRESS NOTES
Pt up walking in room. Tolerated regular diet today and pain is improved. Good FM, no LOF, VB or contractions. VSS Tmax 99.1  Abdomen soft  Fundus nontender  LE without edema or calf tenderness. WBC normal  CMP pending. Urine GC/CT pending. Pt hoping to go home soon. Will wait on pending labs and ID for transition to oral antibiotics.

## 2022-02-25 NOTE — PROGRESS NOTES
lo from home health to call with planning for discharge and IV therapy.  Awaiting call back     [phone]

## 2022-02-25 NOTE — PROGRESS NOTES
Pt resting in bed. Has some RUQ pain. No recent formed bowel movement. States having some mild liquid stool. Denies any VB. Reports yellow discharge still. Denies any cramping or contractions. 's. IV running. Assessment done. Call light within reach. Pt tearful today and emotional. Friend coming to stay with her today. Encouraged shower and ambulation.

## 2022-02-25 NOTE — PROGRESS NOTES
2/25/2022  1:23 PM           RD Nutrition Re-Screen/LOS Note    Pt admit w/N/V, fever and abdominal pain. Nausea subsided and now afebrile for 36 hr on abx. Currently on Regular diet and tolerating. D/C plan soon.  No additional nutrition recommendations at this time and will continue inpatient monitoring      Electronically signed by Ethel Roberts RD, CNSC, LD on 2/25/22 at 1:23 PM EST    Contact: [phone]

## 2022-02-25 NOTE — PROCEDURES
Midline    Catheter insertion date: 2/25/2022     Product Number:  [id number]   Lot No: [id number]   Gauge: 4.5 fr   Lumen: single   R Basilic    Vein Diameter : 0.66 cm   Mid upper arm circumference: 36.5 cm   Catheter Length : 15 cm   Internal Length: 13 cm   External Catheter Length: 2 cm   Ultrasound Used:yes   Floor nurse notified MIdline is okay to use.    : Raymundo Powell RN  Assistant: Kenji Aguirre RN

## 2022-02-25 NOTE — PROGRESS NOTES
Sasha Meehan is a 32 y.o. female patient. Current Facility-Administered Medications   Medication Dose Route Frequency Provider Last Rate Last Admin    acyclovir (ZOVIRAX) 250 mg in dextrose 5 % 50 mL IVPB  5 mg/kg (Ideal) IntraVENous Q8H Dae Banegas MD 50 mL/hr at 02/25/22 0953 250 mg at 02/25/22 0953    ondansetron (ZOFRAN) injection 4 mg  4 mg IntraVENous Q6H PRN Joe Anchors, DO   4 mg at 02/25/22 0650    polyethylene glycol (GLYCOLAX) packet 17 g  17 g Oral Daily Joe Anchors, DO   17 g at 02/23/22 3822    lactated ringers infusion   IntraVENous Continuous iWnnie Ortiz  mL/hr at 02/25/22 0139 New Bag at 02/25/22 0139    acetaminophen (TYLENOL) tablet 650 mg  650 mg Oral Q4H PRN Winnie Ortiz MD   650 mg at 02/25/22 5742    ertapenem (INVanz) 1000 mg IVPB minibag  1,000 mg IntraVENous Q24H Dae Banegas MD   Stopped at 02/25/22 0840     Allergies   Allergen Reactions    Latex Hives and Itching    Penicillins Hives     Principal Problem:    Chorioamnionitis in second trimester, fetus 1  Active Problems:    21 weeks gestation of pregnancy    Chorioamnionitis in first trimester  Resolved Problems:    * No resolved hospital problems. *    Blood pressure 121/66, pulse 93, temperature 98.1 °F (36.7 °C), temperature source Oral, resp. rate 14, height 5' 2\" (1.575 m), weight 222 lb (100.7 kg), last menstrual period 09/20/2021, SpO2 97 %. Subjective:  Symptoms:  Improved. Diet:  Adequate intake. Activity level: Normal.    Pain:  She complains of pain that is mild. She reports pain is improving. Pain is well controlled. Objective:  General Appearance:  Comfortable, well-appearing and in no acute distress. Vital signs: (most recent): Blood pressure 121/66, pulse 93, temperature 98.1 °F (36.7 °C), temperature source Oral, resp. rate 14, height 5' 2\" (1.575 m), weight 222 lb (100.7 kg), last menstrual period 09/20/2021, SpO2 97 %.   Vital signs are normal.    Output: Producing urine.    Lungs:  Normal effort. Heart: Normal rate. Abdomen: There is right upper quadrant tenderness. Skin:  Warm and dry. Her tenderness is mild in nature there is no rebound there is no guarding according to the patient is significantly improved since when she came in. There is no uterine tenderness noted. Assessment:    Condition: In stable condition. Improving. (She has been afebrile for almost 36 hours. I do not feel that this is chorioamnionitis. Fetal heart tones are reassuring. She denies contractions loss of fluid rupture membranes vaginal bleeding. ). Plan:   Encourage ambulation. Regular diet. (I think the patient is getting close to discharge. If he wanted to continue the antibiotics until she is afebrile for more than 48 hours and then send her home without antibiotics I think that is reasonable. If you wanted to send her home today as well I think that also is reasonable. ).        Viky Ahumada MD  2/25/2022

## 2022-02-25 NOTE — PROGRESS NOTES
[address] [place] [address] [facility]  Progress Note      Chief Complaint   Patient presents with    Migraine     seen thursday for same thing    Fever     starting tuesday states its 104    Vaginal Discharge       SUBJECTIVE:  Patient is tolerating medications. No reported adverse drug reactions. No nausea, vomiting, diarrhea. Some difficulty with the metronidazole. Afebrile  Right upper quadrant  but improved  LFTs down  Revelation about 's infidelity and STD exposure noted-discussed with patient  Review of systems:  As stated above in the chief complaint, otherwise negative. Medications:  Scheduled Meds:   acyclovir  400 mg Oral TID    polyethylene glycol  17 g Oral Daily    ertapenem (INVanz) IVPB  1,000 mg IntraVENous Q24H     Continuous Infusions:   lactated ringers 125 mL/hr at 22 0139     PRN Meds:ondansetron, acetaminophen    OBJECTIVE:  /66   Pulse 93   Temp 98.1 °F (36.7 °C) (Oral)   Resp 14   Ht 5' 2\" (1.575 m)   Wt 222 lb (100.7 kg)   LMP 2021 (Exact Date)   SpO2 97%   BMI 40.60 kg/m²   Temp  Av.7 °F (37.1 °C)  Min: 98.1 °F (36.7 °C)  Max: 99.1 °F (37.3 °C)  Constitutional: The patient is awake, alert, and oriented. Skin: Warm and dry. No rashes were noted. HEENT: Round and reactive pupils. Moist mucous membranes. No ulcerations or thrush. Neck: Supple to movements. Chest: No use of accessory muscles to breathe. Symmetrical expansion. No wheezing, crackles or rhonchi. Cardiovascular: S1 and S2 are rhythmic and regular. No murmurs appreciated. Abdomen: Positive bowel sounds to auscultation. Benign to palpation. No masses felt. No hepatosplenomegaly. 5 months gravid abdomen with left lower quadrant pain as well as localized right upper quadrant pain and positive Soto sign improved  Genitourinary: Female  Extremities: No clubbing, no cyanosis, no edema.   Lines: peripheral    Laboratory and Tests Review:  Lab Results   Component Value Date    WBC 6.5 02/25/2022    WBC 5.0 02/22/2022    WBC 5.2 02/21/2022    HGB 9.8 (L) 02/25/2022    HCT 30.2 (L) 02/25/2022    MCV 95.9 02/25/2022     02/25/2022     Lab Results   Component Value Date    NEUTROABS 5.07 02/25/2022    NEUTROABS 4.55 02/22/2022    NEUTROABS 5.09 02/20/2022     No results found for: CRPHS  Lab Results   Component Value Date    ALT 99 (H) 02/25/2022    AST 78 (H) 02/25/2022    ALKPHOS 98 02/25/2022    BILITOT 0.2 02/25/2022     Lab Results   Component Value Date     02/25/2022    K 3.0 02/25/2022    K 3.4 02/19/2022     02/25/2022    CO2 24 02/25/2022    BUN 2 02/25/2022    CREATININE 0.4 02/25/2022    CREATININE 0.4 02/23/2022    CREATININE 0.4 02/22/2022    GFRAA >60 02/25/2022    LABGLOM >60 02/25/2022    GLUCOSE 97 02/25/2022    PROT 6.2 02/25/2022    LABALBU 2.8 02/25/2022    CALCIUM 8.5 02/25/2022    BILITOT 0.2 02/25/2022    ALKPHOS 98 02/25/2022    AST 78 02/25/2022    ALT 99 02/25/2022     No results found for: CRP  No results found for: [address]  Radiology:   We will check ultrasound gallbladder today    Microbiology:   Lab Results   Component Value Date    BC 5 Days no growth 02/19/2022    BC 5 Days no growth 02/17/2022     Lab Results   Component Value Date    BLOODCULT2 5 Days no growth 02/19/2022    BLOODCULT2 5 Days no growth 02/17/2022     No results found for: WNDABS  No results found for: RESPSMEAR  No results found for: MPNEUMO, CLAMYDCU, LABLEGI, AFBCX, FUNGSM, LABFUNG  No results found for: CULTRESP  No results found for: CXCATHTIP  No results found for: BFCS  No results found for: CXSURG  Urine Culture, Routine   Date Value Ref Range Status   02/19/2022 <10,000 CFU/mL  Mixed gram positive organisms    Final     No results found for: MRSAC      Assessment:  · Left lower quadrant pain the patient has been obstipated for about a week and had an ultrasound that effectively ruled out renal lithiasis although she has bilateral hydronephrosis. · Gardnerella vaginitis-seems to be improving  · Suspect chorioendometritis especially with the LFT slightly going up as well as platelets coming down and the pain becoming more diffuse   · Rule out translocation from the obstipation and constipation from the left lower quadrant  · New right upper quadrant pain positive Soto sign along with the left lower quadrant pain- US consistent c choly    · STD exposure  · Transaminases are retreating     Plan:    · Cont Invanz 7 more days  · Mid line  · STD treated c flagyl. zithromax and on acyclovir and invanz  · choly treated c invanz  · RPR -  · GC and Chlamydia pending  · EBV viral load and CMV viral load and   · HIV  -  · Monitor labs-check a CBC and differential today with a complete metabolic  · Follow up as Ricky Meyers MD  3:51 PM  2/25/2022

## 2022-02-25 NOTE — PROGRESS NOTES
Spoke with lo from home health. Script faxed. Nursing communication order placed. Pt set up for home IV therapy on discharge.

## 2022-02-25 NOTE — HOME CARE
Referral received for home health services for possible home iv. Pricing as follows:        [facility] [employer]    Spoke with patient verified demos and discussed home health services. Patient will need home iv script faxed to [phone] and a home health order placed in Epic. Notified floor nurse of same.      Thank you for this referral, [facility] [phone]

## 2022-02-25 NOTE — PROGRESS NOTES
Spoke with home health nurse Bessy Redman. IV therapy appointment set for Sunday. Unable to schedule for Saturday. Spoke with dr Brendan Xie. Patient may be discharged tomorrow 2/26/22 after AM dose of Invanz.

## 2022-02-25 NOTE — PROGRESS NOTES
Maternal Fetal Medicine update -  2/24/22     Pt seen , scanned daily  Reviewed with Dr Bunny Runner-    Clinically - no uterine cramps or tenderness  Baby active, non tachycardic  No Srom , no vag bleeding  No dyspnea nor cough  Profound constipation ( no BM for 2 weeks) - getting some relief now  No elevation of WBCs   Mild temp - feeling better past 24hrs  Neg hepatitis panel    Neg HIV/neg RPR / Neg GC/CT on prenatal labs-   ~Retesting for these due to partner exposure  ( GC/CT/Herpes plus ?) since prenatal labs     Pending EB V/ CMV viral loads  (EBV-Mono as teen)    RUQ pain/N/V improved-   US/MRI GB/liver/Kidneys/abdomen r/o masses, abscess,pyelo/stonesCXR unremarkable  COVID neg      GSurg recommends expectant rather than exploratory mgmt given these findings.     Pregnancy/uterus/baby seem uninfected and no apparent indication to deliver at previable 22w     MFM Will follow -    Eitan Ballard MD

## 2022-02-26 VITALS
SYSTOLIC BLOOD PRESSURE: 111 MMHG | HEART RATE: 84 BPM | WEIGHT: 222 LBS | TEMPERATURE: 97 F | DIASTOLIC BLOOD PRESSURE: 71 MMHG | OXYGEN SATURATION: 100 % | BODY MASS INDEX: 40.85 KG/M2 | HEIGHT: 62 IN | RESPIRATION RATE: 18 BRPM

## 2022-02-26 PROCEDURE — 6370000000 HC RX 637 (ALT 250 FOR IP): Performed by: OBSTETRICS & GYNECOLOGY

## 2022-02-26 PROCEDURE — 6370000000 HC RX 637 (ALT 250 FOR IP): Performed by: SPECIALIST

## 2022-02-26 PROCEDURE — 2580000003 HC RX 258: Performed by: SPECIALIST

## 2022-02-26 PROCEDURE — 6360000002 HC RX W HCPCS: Performed by: SPECIALIST

## 2022-02-26 PROCEDURE — 99213 OFFICE O/P EST LOW 20 MIN: CPT

## 2022-02-26 RX ADMIN — ACYCLOVIR 400 MG: 200 CAPSULE ORAL at 08:53

## 2022-02-26 RX ADMIN — ERTAPENEM SODIUM 1000 MG: 1 INJECTION, POWDER, LYOPHILIZED, FOR SOLUTION INTRAMUSCULAR; INTRAVENOUS at 08:32

## 2022-02-26 RX ADMIN — HEPARIN SODIUM (PORCINE) LOCK FLUSH IV SOLN 100 UNIT/ML 100 UNITS: 100 SOLUTION at 09:06

## 2022-02-26 RX ADMIN — POLYETHYLENE GLYCOL 3350 17 G: 17 POWDER, FOR SOLUTION ORAL at 08:35

## 2022-02-26 NOTE — PROGRESS NOTES
Discharge instructions given to the patient at the bedside. Pt verbalizes understanding of all instructions including when to call provider and/or return to the hospital. Pt verbalizes understanding to keep all scheduled appointments. Home health care planning to present to patients house tomorrow around 0830, pt given 2 prescriptions to take to pharmacy. Pt denies any questions and exits ambulatory in good condition.

## 2022-02-27 LAB
HERPES TYPE 1/2 IGM COMBINED: 3.56 IV
HERPES TYPE I/II IGG COMBINED: 6.01 IV
HSV 1 GLYCOPROTEIN G AB IGG: 0.12 IV
HSV 2 GLYCOPROTEIN G AB IGG: 0.45 IV
MISCELLANEOUS LAB TEST RESULT: NORMAL

## 2022-02-28 LAB
C. TRACHOMATIS DNA ,URINE: NEGATIVE
N. GONORRHOEAE DNA, URINE: NEGATIVE
SOURCE: NORMAL

## 2022-03-02 NOTE — DISCHARGE SUMMARY
Eliazar Regan    Admission Date:  2/19/2022  Discharge date:  2/26/2022       32 y.o. Jahaira Woo with a diagnosis of fever, RUQ pain and suspected PID. She had a 7 day uncomplicated stay and was discharged to home on IV antibiotics. Discharged to home in stable condition. Please refer to the chart for further details.       Chasity Raya,   3/2/2022

## 2022-03-04 LAB
EBV, QUANT. COPY/ML: <390 CPY/ML
EBV, QUANT. INTERP: NOT DETECTED
EBV, QUANT. LOG: <2.6 LOG
EBV, QUANT. SOURCE: NORMAL

## 2022-03-09 LAB
CMV DNA QNT PCR: <2.6 LOG CPY/ML
CMV DNA QUANTATATIVE INTERPRETATION: <2.4 LOG IU/ML
CMV DNA QUANTATATIVE INTERPRETATION: NOT DETECTED
CMV DNA QUANTITATIVE: <227 IU/ML
CMV SOURCE: NORMAL
CMVQ COPY/ML: <390 CPY/ML

## 2022-05-08 ENCOUNTER — HOSPITAL ENCOUNTER (OUTPATIENT)
Age: 32
Discharge: HOME | End: 2022-05-10
Attending: OBSTETRICS & GYNECOLOGY | Admitting: OBSTETRICS & GYNECOLOGY
Payer: COMMERCIAL

## 2022-05-08 PROBLEM — O26.899 ABDOMINAL CRAMPING AFFECTING PREGNANCY, ANTEPARTUM: Status: ACTIVE | Noted: 2022-05-08

## 2022-05-08 PROBLEM — R10.9 ABDOMINAL CRAMPING AFFECTING PREGNANCY, ANTEPARTUM: Status: ACTIVE | Noted: 2022-05-08

## 2022-05-08 PROBLEM — Z3A.32 32 WEEKS GESTATION OF PREGNANCY: Status: ACTIVE | Noted: 2022-05-08

## 2022-05-08 LAB
BACTERIA: NORMAL /HPF
BILIRUBIN URINE: NEGATIVE
BLOOD, URINE: NEGATIVE
CLARITY: CLEAR
COLOR: YELLOW
FETAL FIBRONECTIN: POSITIVE
GLUCOSE URINE: NEGATIVE MG/DL
KETONES, URINE: NEGATIVE MG/DL
LEUKOCYTE ESTERASE, URINE: NEGATIVE
NITRITE, URINE: NEGATIVE
PH UA: 7 (ref 5–9)
PROTEIN UA: NEGATIVE MG/DL
RBC UA: NORMAL /HPF (ref 0–2)
SPECIFIC GRAVITY UA: <=1.005 (ref 1–1.03)
UROBILINOGEN, URINE: 0.2 E.U./DL
WBC UA: NORMAL /HPF (ref 0–5)

## 2022-05-08 PROCEDURE — 81001 URINALYSIS AUTO W/SCOPE: CPT

## 2022-05-08 PROCEDURE — 96372 THER/PROPH/DIAG INJ SC/IM: CPT

## 2022-05-08 PROCEDURE — 99214 OFFICE O/P EST MOD 30 MIN: CPT | Performed by: NURSE PRACTITIONER

## 2022-05-08 PROCEDURE — 82731 ASSAY OF FETAL FIBRONECTIN: CPT

## 2022-05-08 PROCEDURE — 6360000002 HC RX W HCPCS: Performed by: OBSTETRICS & GYNECOLOGY

## 2022-05-08 PROCEDURE — G0378 HOSPITAL OBSERVATION PER HR: HCPCS

## 2022-05-08 RX ORDER — BETAMETHASONE SODIUM PHOSPHATE AND BETAMETHASONE ACETATE 3; 3 MG/ML; MG/ML
12 INJECTION, SUSPENSION INTRA-ARTICULAR; INTRALESIONAL; INTRAMUSCULAR; SOFT TISSUE EVERY 24 HOURS
Status: COMPLETED | OUTPATIENT
Start: 2022-05-08 | End: 2022-05-09

## 2022-05-08 RX ADMIN — BETAMETHASONE SODIUM PHOSPHATE AND BETAMETHASONE ACETATE 12 MG: 3; 3 INJECTION, SUSPENSION INTRA-ARTICULAR; INTRALESIONAL; INTRAMUSCULAR at 10:23

## 2022-05-08 ASSESSMENT — PAIN SCALES - GENERAL: PAINLEVEL_OUTOF10: 2

## 2022-05-08 NOTE — PROGRESS NOTES
Patient repositioned to left side. Fetal tracing reviewed by Dr. Trevor Gaitan. Continue to monitor.

## 2022-05-08 NOTE — PROGRESS NOTES
Updated Dr. Quintana Said FFN is positive. Orders to continue to monitor patient. Celestone and patient may have a general diet.

## 2022-05-08 NOTE — PROGRESS NOTES
Department of Obstetrics and Gynecology  Labor and Delivery   Attending Progress Note      SUBJECTIVE:  Mild cramping. Good FM, no LOF,VB. OBJECTIVE:      Fetal heart rate:       Baseline Heart Rate:  140        Accelerations:  present       Long Term Variability:  moderate       Decelerations:  absent         Contraction frequency: 0 minutes        Cervix: not re-examined.           ASSESSMENT & PLAN:    IUP 28 w 6d  Cramping  Pos FFN, cervix dilated  Celestone X1 given- will monitor overnight and have MFM see n am.        Tollie Blizzard, DO

## 2022-05-08 NOTE — H&P
Department of Obstetrics and Gynecology  Labor and Delivery  Nurse practitioner Triage Note      SUBJECTIVE:  Ivette Brice is a 32 y.o. female, , Patient's last menstrual period was 2021 (exact date). ,Estimated Date of Delivery: 22, 32w6d, with the complaint of low back pain, abdominal cramping, and loss of mucous plug. Pt denies lof, vb or decreased fm. Denies urinary sx.      Prenatal course: Herpes    MEDICAL HISTORY:      Diagnosis Date    Missed      Pregnancy     21-7 weeks    Rectal bleed        SURGICAL HISTORY:      Procedure Laterality Date    DILATION AND CURETTAGE OF UTERUS  2021    INSERT MIDLINE CATHETER  2022         LAPAROSCOPY  2014    WISDOM TOOTH EXTRACTION         FAMILY HISTORY      Problem Relation Age of Onset    Other Cancer Mother        Medication prior to Admission:  Medications Prior to Admission: acetaminophen (TYLENOL) 500 MG tablet, Take 1 tablet by mouth 4 times daily as needed for Pain  docusate sodium (COLACE) 100 MG capsule, Take 100 mg by mouth daily  Prenatal Vit-Fe Fumarate-FA (PRENATAL VITAMIN PO), Take by mouth    ALLERGIES:  Latex and Penicillins      Review of Systems:   Ears, nose, mouth, throat, and face: negative  Respiratory: negative  Cardiovascular: negative  Gastrointestinal: negative  Genitourinary:negative  Integument/breast: negative  Hematologic/lymphatic: negative  Musculoskeletal:negative  Neurological: negative  Behavioral/Psych: negative  Endocrine: negative  Allergic/Immunologic: negative    OBJECTIVE    Vitals:  /69   Pulse 93   Temp 98.6 °F (37 °C) (Oral)   Resp 16   LMP 2021 (Exact Date)   SpO2 100%       NECK:  Supple, symmetrical, trachea midline, no adenopathy, thyroid symmetric, not enlarged and no tenderness, skin normal  LUNGS:  No increased work of breathing, good air exchange, clear to auscultation bilaterally, no crackles or wheezing  CARDIOVASCULAR:  normal S1 and S2  ABDOMEN: Soft, nontender    Cervix:             Dilation:  1 cm         Effacement:  Long                       Fetal Position:  Cephalic    Membranes:  Intact    Fetal heart rate:         Baseline Heart Rate:  140        Accelerations:  present       Decelerations: absent       Variability:  moderate    Contraction frequency: 0 minutes        General Labs:    CBC:   Lab Results   Component Value Date    WBC 10.1 03/14/2022    RBC 3.61 03/14/2022    HGB 11.2 03/14/2022    HCT 33.8 03/14/2022    MCV 93.6 03/14/2022    RDW 13.0 03/14/2022     03/14/2022     CMP:    Lab Results   Component Value Date     03/14/2022    K 3.4 03/14/2022    K 3.4 02/19/2022     03/14/2022    CO2 23 03/14/2022    BUN 4 03/14/2022    PROT 6.4 03/14/2022     U/A:  No components found for: Jeneal Bias, USPGRAV, UPH, UPROTEIN, UGLUCOSE, UKETONE, UBILI, UBLOOD, UNITRITE, UUROBIL, [facility] abel, USQEPI, [employer], [facility], [place], [place], [place]        ASSESSMENT & PLAN:   Discussed with dr Kristina Mantilla  Urinalysis  ffn

## 2022-05-08 NOTE — PROGRESS NOTES
32w3d presents to LD with complaints of back pain and cramping that started earlier in the week. She stated she has been taking tylenol for the pain. Pain is rated a 5 out of 10. Patient also states losing her mucus plug today so she called Dr. Kira Velasco and was told to come in to evaluate for contractions. efm applied, call light in reach, vss.

## 2022-05-09 ENCOUNTER — ANCILLARY PROCEDURE (OUTPATIENT)
Dept: OBGYN CLINIC | Age: 32
End: 2022-05-09
Payer: COMMERCIAL

## 2022-05-09 PROBLEM — O36.8390 FETAL HEART RATE DECELERATIONS AFFECTING MANAGEMENT OF MOTHER: Status: ACTIVE | Noted: 2022-05-09

## 2022-05-09 PROCEDURE — 76820 UMBILICAL ARTERY ECHO: CPT | Performed by: OBSTETRICS & GYNECOLOGY

## 2022-05-09 PROCEDURE — 96372 THER/PROPH/DIAG INJ SC/IM: CPT

## 2022-05-09 PROCEDURE — 76805 OB US >/= 14 WKS SNGL FETUS: CPT | Performed by: OBSTETRICS & GYNECOLOGY

## 2022-05-09 PROCEDURE — 99243 OFF/OP CNSLTJ NEW/EST LOW 30: CPT | Performed by: OBSTETRICS & GYNECOLOGY

## 2022-05-09 PROCEDURE — 6360000002 HC RX W HCPCS: Performed by: OBSTETRICS & GYNECOLOGY

## 2022-05-09 PROCEDURE — 76819 FETAL BIOPHYS PROFIL W/O NST: CPT | Performed by: OBSTETRICS & GYNECOLOGY

## 2022-05-09 RX ADMIN — BETAMETHASONE SODIUM PHOSPHATE AND BETAMETHASONE ACETATE 12 MG: 3; 3 INJECTION, SUSPENSION INTRA-ARTICULAR; INTRALESIONAL; INTRAMUSCULAR at 10:12

## 2022-05-09 ASSESSMENT — PAIN SCALES - GENERAL: PAINLEVEL_OUTOF10: 0

## 2022-05-09 NOTE — PROGRESS NOTES
Dr. Margaret Jim called back updated on pt second dose of celestone and just waiting on M to see her, but requesting to shower, orders received she is able to shower

## 2022-05-09 NOTE — PROGRESS NOTES
Assumed pt care pt resting comfortably in bed, denies any lof, vb, cramping, contractions, states good fetal movement

## 2022-05-09 NOTE — PROGRESS NOTES
Assumed care of pt. Pt resting in bed on left side at this time. Denies any pain, only slight discomfort from the movement of baby. Denies any VB, ctx or LOF. States she feels as though she lost more of her mucous plug as she noticed a small amount of thick discharge. VSS. Call light within reach.

## 2022-05-09 NOTE — PROGRESS NOTES
Bryce Bermeo, DO  [address] [facility]     RE:  Alfredo Garcia  : 1990   AGE: 32 y.o. This report has been created using voice recognition software. It may contain errors which are inherent in voice recognition technology. Dear Dr. Mani Mcnamara:    Hari Cerrato had a consultation today for the following indications:    Patient Active Problem List   Diagnosis    Abdominal cramping affecting pregnancy, antepartum    Fetal heart rate decelerations affecting management of mother    Primary genital herpes during this current pregnancy     Hari Cerrato is a 32 y.o. female, who is G3(1,0,1,1). She has an Estimated Date of Delivery: 22 based on her previous ultrasound assessment. She is currently 33 weeks 0 days gestation based on that assessment. The patient was admitted to the labor and delivery unit for evaluation and management secondary to a complaint of low back pain, abdominal cramping, and mucousy vaginal discharge. She had a positive fetal fibronectin assay on admission. She has had no vaginal bleeding or leaking of amniotic fluid. Her fetal movement has been good. The patient was diagnosed with primary genital herpes earlier in her current pregnancy. She has no current outbreak. The fetal heart rate tracing shows moderate variability with accelerations. Intermittent variable decelerations were noted. The tracing is category 2. A fetal ultrasound evaluation was performed and a detailed report included in the EMR under the imaging tab. A living mcdaniels intrauterine fetus was identified in the cephalic presentation, normal fetal heart motion and normal fetal motion noted. A bilobed placenta was suspected. There was a marginal insertion of the umbilical cord. The amniotic fluid index was 8.7 cm. The estimated fetal weight was at the 59th growth percentile for gestational age.   Visualization of the fetal anatomy was somewhat limited secondary to the fetal position. The biophysical profile and cord Doppler studies were both reassuring. There was no evidence of absence, or reversal of end-diastolic flow. GENETIC SCREENING/TERATOLOGY COUNSELING              (Includes patient, FTB, and any affected family members)    Patient Age > 35 Years NO   Thalassemia ( MVC<80) NO   Congential Heart Defect NO   Neural Tube Defect NO   Kp-Sachs NO   Sickle Cell Disease NO   Sickle Cell Trait NO   Sickle C Disease or Trait NO   Hemophilia NO   Muscular Dystrophy NO   Cystic Fibrosis NO   [place] Disease NO   Autism NO   Mental Retardation NO   History of Fragile X NO   Maternal Diabetes NO   Other Genetic Disease or Syndrome NO   Previous Child With Congenital Abnormality Not Listed NO   Recreational Drugs NO                                                                 INFECTION HISTORY     HEPATITIS IMMUNIZED:  NO   HEPATITIS INFECTION:  NO   EXPOSURE TO TB NO   PARVOVIRUS B-19 NO   CHICKEN POX  YES   MEASLES NO   HIV NO   OTHER RASH OR VIRAL ILLNESS SINCE LMP NO   UTI RECURRENT NO   HPV NO     OB History    Para Term  AB Living   3 1 1   1     SAB IAB Ectopic Molar Multiple Live Births   1                # Outcome Date GA Lbr Hever/2nd Weight Sex Delivery Anes PTL Lv   3 Current            2 2021           1 Term                PAST GYNECOLOGICAL  HISTORY:  Negative for abnormal pap smears. Positive for sexually transmitted diseases. Primary genital herpes 2022  Negative for cervical LEEP / conization /cryosurgery. Negative for uterine surgery. Negative for ovarian or tubal surgery.      Past Medical History:   Diagnosis Date    Missed      Pregnancy     21-7 weeks    Rectal bleed        Past Surgical History:   Procedure Laterality Date    DILATION AND CURETTAGE OF UTERUS  2021    INSERT MIDLINE CATHETER  2022         LAPAROSCOPY      WISDOM TOOTH EXTRACTION         Allergies   Allergen Reactions    Latex Hives and Itching    Penicillins Hives       No current facility-administered medications for this encounter. Social History     Tobacco Use    Smoking status: Never Smoker    Smokeless tobacco: Never Used   Substance Use Topics    Alcohol use: Not Currently     FAMILY MEDICAL HISTORY:   Negative for congenital abnormalities, autism, genetic disease and mental retardation, not listed above. Review of Systems :   CONSTITUTIONAL : No fever, no chills   HEENT : No headache, no visual changes, no rhinorrhea, no sore throat   CARDIOVASCULAR : No pain, no palpitations, no edema   RESPIRATORY : No pain, no shortness of breath   GASTROINTESTINAL : No N/V, no D/C, no abdominal pain   GENITOURINARY : No dysuria, hematuria and no incontinence   MUSCULOSKELETAL : No myalgia, No back pain  NEUROLOGICAL : No numbness, no tingling, no tremors. No history of seizures  ALL OTHER SYSTEMS WERE REPORTED AS NEGATIVE. PERTINENT PHYSICAL EXAMINATION:   Patient Vitals for the past 24 hrs:   BP Temp Temp src Pulse Resp   05/09/22 0754 111/68 98.1 °F (36.7 °C) Oral 81 12   05/08/22 2007 (!) 101/53 98.3 °F (36.8 °C) Oral 78 12     GENERAL:   The patient is a well developed, female who is alert cooperative and oriented times three in no acute distress. HEENT:  Normo cephalic and atraumatic. No facial edema. ABDOMEN:   Her uterus is gravid. She had no complaint of abdominal pain or tenderness. The fetal heart rate is 131 bpm. The fetus is in the cephalic presentation which was confirmed by the ultrasound assessment. EXTREMITIES:  No peripheral edema is noted. PELVIC EXAMINATION:  The examination was not repeated at this time. The patient had a positive fetal fibronectin assay. The cervical examination performed by Mariana Gorman at the time of the patient's admission was 1 cm dilation with a long cervical length.     Admission on 05/08/2022   Component Date Value Ref Range Status    Color, UA 05/08/2022 Yellow  Straw/Yellow Final    Clarity, UA 05/08/2022 Clear  Clear Final    Glucose, Ur 05/08/2022 Negative  Negative mg/dL Final    Bilirubin Urine 05/08/2022 Negative  Negative Final    Ketones, Urine 05/08/2022 Negative  Negative mg/dL Final    Specific Gravity, UA 05/08/2022 <=1.005  1.005 - 1.030 Final    Blood, Urine 05/08/2022 Negative  Negative Final    pH, UA 05/08/2022 7.0  5.0 - 9.0 Final    Protein, UA 05/08/2022 Negative  Negative mg/dL Final    Urobilinogen, Urine 05/08/2022 0.2  <2.0 E.U./dL Final    Nitrite, Urine 05/08/2022 Negative  Negative Final    Leukocyte Esterase, Urine 05/08/2022 Negative  Negative Final    WBC, UA 05/08/2022 NONE  0 - 5 /HPF Final    RBC, UA 05/08/2022 NONE  0 - 2 /HPF Final    Bacteria, UA 05/08/2022 NONE SEEN  None Seen /HPF Final    Fetal Fibronectin 05/08/2022 POSITIVE   Final     IMPRESSION:    1.  IUP at 33 weeks 0 days gestation based on her Estimated Date of Delivery: 6/27/22    2. Abdominal cramping    3. Positive fetal fibronectin assay 5/8/2022    4. Estimated fetal weight was at the 59th growth percentile 5/9/2022    5. Reassuring biophysical profile and cord Doppler testing 5/9/2022    6. Intermittent variable decelerations 5/9/2022    7. History of primary genital herpes 2/2022    8. Fully vaccinated with J&J 2021. Not boostered. 9.  Suspected bilobed placenta with velamentous insertion of the umbilical cord  10. Celestone course completed 5/9/2022    PLAN:  The patient is currently admitted for evaluation and management. DVT prophylaxis should be utilized throughout her admission. Continuous fetal heart rate and uterine contraction monitoring should be utilized for now. Further evaluation and management will be dependent on the results of the patient's testing and her clinical presentation.     If you have any questions regarding her management, please contact me at your convenience and thank you for allowing me to participate in her care.     Sincerely,        Yamileth Chacon MD, MS, Amaury Grewal, STELLACS, RDMS, RVT  Director [address] [place] [address]  [phone]

## 2022-05-10 ENCOUNTER — ANCILLARY PROCEDURE (OUTPATIENT)
Dept: OBGYN CLINIC | Age: 32
End: 2022-05-10
Payer: COMMERCIAL

## 2022-05-10 VITALS
OXYGEN SATURATION: 100 % | DIASTOLIC BLOOD PRESSURE: 65 MMHG | HEART RATE: 82 BPM | RESPIRATION RATE: 15 BRPM | SYSTOLIC BLOOD PRESSURE: 106 MMHG | TEMPERATURE: 97.9 F

## 2022-05-10 PROCEDURE — 76819 FETAL BIOPHYS PROFIL W/O NST: CPT | Performed by: OBSTETRICS & GYNECOLOGY

## 2022-05-10 PROCEDURE — 99212 OFFICE O/P EST SF 10 MIN: CPT | Performed by: OBSTETRICS & GYNECOLOGY

## 2022-05-10 PROCEDURE — 76820 UMBILICAL ARTERY ECHO: CPT | Performed by: OBSTETRICS & GYNECOLOGY

## 2022-05-10 PROCEDURE — 76817 TRANSVAGINAL US OBSTETRIC: CPT | Performed by: OBSTETRICS & GYNECOLOGY

## 2022-05-10 NOTE — PROGRESS NOTES
Dr. Jose Bueno updated about occasional variables and 1 late variable with a contraction over night.

## 2022-05-10 NOTE — PROGRESS NOTES
Liliana Search, DO  [address] [facility]     RE:  Maricel Epperson  : 1990   AGE: 32 y.o. This report has been created using voice recognition software. It may contain errors which are inherent in voice recognition technology. Dear Dr. Zarina Choudhury:    Genna Scheuermann had a consultation today for the following indications:    Patient Active Problem List   Diagnosis    Abdominal cramping affecting pregnancy, antepartum    Fetal heart rate decelerations affecting management of mother    Primary genital herpes during this current pregnancy     Genna Scheuermann is a 32 y.o. female, who is G3(1,0,1,1). She has an Estimated Date of Delivery: 22 based on her previous ultrasound assessment. She is currently 33 weeks 1 days gestation based on that assessment. The patient had no complaints today. She stated that she is feeling well. She is having no vaginal bleeding or leaking of amniotic fluid. The patient was admitted to the labor and delivery unit for evaluation and management secondary to a complaint of low back pain, abdominal cramping, and mucousy vaginal discharge. She had a positive fetal fibronectin assay on admission. She has had no vaginal bleeding or leaking of amniotic fluid. Her fetal movement has been good. The patient was diagnosed with primary genital herpes earlier in her current pregnancy. She has no current outbreak. The fetal heart rate tracing shows moderate variability with accelerations. Occasional variable decelerations were noted. The tracing is category 2. A fetal ultrasound evaluation was performed on May 10, 2022. A detailed report included in the EMR under the imaging tab. A living mcdaniels intrauterine fetus was identified in the cephalic presentation, normal fetal heart motion and normal fetal motion noted. A bilobed placenta was suspected. There was a marginal insertion of the umbilical cord. The amniotic fluid index was 8.7 cm. The estimated fetal weight was at the 59th growth percentile for gestational age. Visualization of the fetal anatomy was somewhat limited secondary to the fetal position. The biophysical profile and cord Doppler studies were both reassuring. There was no evidence of absence, or reversal of end-diastolic flow. A fetal ultrasound evaluation was performed on 5/10/2022 and a detailed report is included in the EMR and the imaging tab. A living mcdaniels intrauterine fetus was identified in the cephalic presentation, with normal fetal heart motion and normal fetal motion noted. The placenta was posterior. The amniotic fluid index was 16.7 cm. The biophysical profile cord Doppler studies were both reassuring. There was no evidence of absence, or reversal of end-diastolic flow.                  GENETIC SCREENING/TERATOLOGY COUNSELING              (Includes patient, FTB, and any affected family members)    Patient Age > 35 Years NO   Thalassemia ( MVC<80) NO   Congential Heart Defect NO   Neural Tube Defect NO   Kp-Sachs NO   Sickle Cell Disease NO   Sickle Cell Trait NO   Sickle C Disease or Trait NO   Hemophilia NO   Muscular Dystrophy NO   Cystic Fibrosis NO   [place] Disease NO   Autism NO   Mental Retardation NO   History of Fragile X NO   Maternal Diabetes NO   Other Genetic Disease or Syndrome NO   Previous Child With Congenital Abnormality Not Listed NO   Recreational Drugs NO                                                                 INFECTION HISTORY     HEPATITIS IMMUNIZED:  NO   HEPATITIS INFECTION:  NO   EXPOSURE TO TB NO   PARVOVIRUS B-19 NO   CHICKEN POX  YES   MEASLES NO   HIV NO   OTHER RASH OR VIRAL ILLNESS SINCE LMP NO   UTI RECURRENT NO   HPV NO     OB History    Para Term  AB Living   3 1 1   1     SAB IAB Ectopic Molar Multiple Live Births   1                # Outcome Date GA Lbr Hever/2nd Weight Sex Delivery Anes PTL Lv   3 Current            2 SAB            1 Term                PAST GYNECOLOGICAL  HISTORY:  Negative for abnormal pap smears. Positive for sexually transmitted diseases. Primary genital herpes 2022  Negative for cervical LEEP / conization /cryosurgery. Negative for uterine surgery. Negative for ovarian or tubal surgery. Past Medical History:   Diagnosis Date    Missed      Pregnancy     21-7 weeks    Rectal bleed        Past Surgical History:   Procedure Laterality Date    DILATION AND CURETTAGE OF UTERUS  2021    INSERT MIDLINE CATHETER  2022         LAPAROSCOPY      WISDOM TOOTH EXTRACTION         Allergies   Allergen Reactions    Latex Hives and Itching    Penicillins Hives       No current facility-administered medications for this encounter. Social History     Tobacco Use    Smoking status: Never Smoker    Smokeless tobacco: Never Used   Substance Use Topics    Alcohol use: Not Currently     FAMILY MEDICAL HISTORY:   Negative for congenital abnormalities, autism, genetic disease and mental retardation, not listed above. Review of Systems :   CONSTITUTIONAL : No fever, no chills   HEENT : No headache, no visual changes, no rhinorrhea, no sore throat   CARDIOVASCULAR : No pain, no palpitations, no edema   RESPIRATORY : No pain, no shortness of breath   GASTROINTESTINAL : No N/V, no D/C, no abdominal pain   GENITOURINARY : No dysuria, hematuria and no incontinence   MUSCULOSKELETAL : No myalgia, No back pain  NEUROLOGICAL : No numbness, no tingling, no tremors. No history of seizures  ALL OTHER SYSTEMS WERE REPORTED AS NEGATIVE.     PERTINENT PHYSICAL EXAMINATION:   Patient Vitals for the past 24 hrs:   BP Temp Temp src Pulse Resp SpO2   05/10/22 1400 106/65 -- -- 82 -- --   05/10/22 0805 102/60 97.9 °F (36.6 °C) Oral 76 15 --   05/10/22 0052 (!) 84/46 98.3 °F (36.8 °C) Oral 66 16 100 %     GENERAL:   The patient is a well developed, female who is alert cooperative and oriented times three in no acute distress. HEENT:  Normo cephalic and atraumatic. No facial edema. ABDOMEN:   Her uterus is gravid. She had no complaint of abdominal pain or tenderness. The fetal heart rate is 145 bpm. The fetus was in the cephalic presentation which was confirmed by the ultrasound assessment. EXTREMITIES:  No peripheral edema is noted. PELVIC EXAMINATION:  Transvaginal ultrasound assessment of the cervix was performed. The cervical length was 24.1 mm, with no apparent funneling of the amniotic membranes noted. Admission on 05/08/2022   Component Date Value Ref Range Status    Color, UA 05/08/2022 Yellow  Straw/Yellow Final    Clarity, UA 05/08/2022 Clear  Clear Final    Glucose, Ur 05/08/2022 Negative  Negative mg/dL Final    Bilirubin Urine 05/08/2022 Negative  Negative Final    Ketones, Urine 05/08/2022 Negative  Negative mg/dL Final    Specific Gravity, UA 05/08/2022 <=1.005  1.005 - 1.030 Final    Blood, Urine 05/08/2022 Negative  Negative Final    pH, UA 05/08/2022 7.0  5.0 - 9.0 Final    Protein, UA 05/08/2022 Negative  Negative mg/dL Final    Urobilinogen, Urine 05/08/2022 0.2  <2.0 E.U./dL Final    Nitrite, Urine 05/08/2022 Negative  Negative Final    Leukocyte Esterase, Urine 05/08/2022 Negative  Negative Final    WBC, UA 05/08/2022 NONE  0 - 5 /HPF Final    RBC, UA 05/08/2022 NONE  0 - 2 /HPF Final    Bacteria, UA 05/08/2022 NONE SEEN  None Seen /HPF Final    Fetal Fibronectin 05/08/2022 POSITIVE   Final     IMPRESSION:    1.  IUP at 33 weeks 1 days gestation based on her Estimated Date of Delivery: 6/27/22    2. Abdominal cramping, resolved    3. Positive fetal fibronectin assay 5/8/2022    4. Estimated fetal weight was at the 59th growth percentile 5/9/2022    5. Reassuring biophysical profile and cord Doppler testing 5/10/2022    6. Intermittent variable decelerations 5/10/2022    7. History of primary genital herpes 2/2022    8. Fully vaccinated with J&J 2021. Not boostered. 9.  Suspected bilobed placenta with velamentous insertion of the umbilical cord  10. Celestone course completed 2022    PLAN:  I would recommend that the patient count fetal movements and call if she notices any subjective decrease in fetal movements, particularly if there are less than 10 major movements in an hour. Non-stress testing should be performed every 3 to 4 days through the balance of the pregnancy. Serial ultrasounds to assess fetal anatomy and growth should be performed. The patient is at increase risk for  morbidity and mortality secondary to her history. Weekly BPP and cord Doppler testing should be performed, unless there is a clinical indication to perform the testing more frequently. The patient was advised to call if she has any increased vaginal discharge, vaginal bleeding, contractions, abdominal pain, back pain or any new significant symptomatology prior to her next visit. I advised her that these are signs and symptoms of cervical change and require follow-up assessment when they occur. I recommended that the patient remain sexually abstinent at this time. No further follow-up appointments have been scheduled in our office, however, we would be happy to see your patient at any time if you request.  Further evaluation and management with be dependent on her clinical presentation and the results of her testing. We would be happy to provide the patient with the recommended fetal testing if you request this. I recommended that the patient received a booster vaccination injection for COVID-19 as soon as possible. The patient is to continue to follow with you in your office for ongoing prenatal care. If you have any questions regarding her management, please contact me at your convenience and thank you for allowing me to participate in her care.     Sincerely,        Mirza Servin MD, MS, FACOG, FACS, RDCS, RDMS, RVT  Director [facility]  [phone]

## 2022-05-10 NOTE — PROGRESS NOTES
Patient denies lof, ctx, vb. Reports good fetal movement. Reports some mild abd discomfort still.  efm on. Call light within reach.

## 2022-05-11 NOTE — PROGRESS NOTES
Dr. Hunter Pemberton updated about patient and MFM okay to discharge patient. Dr. Slade Gilmore ordered to discharge patient home.

## 2022-05-11 NOTE — PROGRESS NOTES
Verbal and written instructions provided to patient. All questions answered to patient satisfaction. Ambulated independently from unit.

## 2022-05-20 ENCOUNTER — HOSPITAL ENCOUNTER (INPATIENT)
Age: 32
LOS: 3 days | Discharge: HOME | End: 2022-05-23
Attending: OBSTETRICS & GYNECOLOGY | Admitting: STUDENT IN AN ORGANIZED HEALTH CARE EDUCATION/TRAINING PROGRAM
Payer: COMMERCIAL

## 2022-05-20 PROBLEM — Z34.93 NORMAL PREGNANCY IN THIRD TRIMESTER: Status: ACTIVE | Noted: 2022-05-20

## 2022-05-20 PROBLEM — O42.90 PROM (PREMATURE RUPTURE OF MEMBRANES): Status: ACTIVE | Noted: 2022-05-20

## 2022-05-20 LAB
ABO/RH: NORMAL
ANTIBODY SCREEN: NORMAL
BASOPHILS ABSOLUTE: 0.06 E9/L (ref 0–0.2)
BASOPHILS RELATIVE PERCENT: 0.4 % (ref 0–2)
EOSINOPHILS ABSOLUTE: 0.13 E9/L (ref 0.05–0.5)
EOSINOPHILS RELATIVE PERCENT: 1 % (ref 0–6)
HCT VFR BLD CALC: 38.6 % (ref 34–48)
HEMOGLOBIN: 12.8 G/DL (ref 11.5–15.5)
IMMATURE GRANULOCYTES #: 0.19 E9/L
IMMATURE GRANULOCYTES %: 1.4 % (ref 0–5)
LYMPHOCYTES ABSOLUTE: 1.75 E9/L (ref 1.5–4)
LYMPHOCYTES RELATIVE PERCENT: 12.8 % (ref 20–42)
MCH RBC QN AUTO: 30.8 PG (ref 26–35)
MCHC RBC AUTO-ENTMCNC: 33.2 % (ref 32–34.5)
MCV RBC AUTO: 92.8 FL (ref 80–99.9)
MONOCYTES ABSOLUTE: 0.91 E9/L (ref 0.1–0.95)
MONOCYTES RELATIVE PERCENT: 6.7 % (ref 2–12)
NEUTROPHILS ABSOLUTE: 10.58 E9/L (ref 1.8–7.3)
NEUTROPHILS RELATIVE PERCENT: 77.7 % (ref 43–80)
PDW BLD-RTO: 13 FL (ref 11.5–15)
PLATELET # BLD: 240 E9/L (ref 130–450)
PMV BLD AUTO: 9.9 FL (ref 7–12)
RBC # BLD: 4.16 E12/L (ref 3.5–5.5)
WBC # BLD: 13.6 E9/L (ref 4.5–11.5)

## 2022-05-20 PROCEDURE — 86901 BLOOD TYPING SEROLOGIC RH(D): CPT

## 2022-05-20 PROCEDURE — 2580000003 HC RX 258: Performed by: OBSTETRICS & GYNECOLOGY

## 2022-05-20 PROCEDURE — 86850 RBC ANTIBODY SCREEN: CPT

## 2022-05-20 PROCEDURE — 99222 1ST HOSP IP/OBS MODERATE 55: CPT | Performed by: STUDENT IN AN ORGANIZED HEALTH CARE EDUCATION/TRAINING PROGRAM

## 2022-05-20 PROCEDURE — 6360000002 HC RX W HCPCS: Performed by: STUDENT IN AN ORGANIZED HEALTH CARE EDUCATION/TRAINING PROGRAM

## 2022-05-20 PROCEDURE — 85025 COMPLETE CBC W/AUTO DIFF WBC: CPT

## 2022-05-20 PROCEDURE — 2580000003 HC RX 258: Performed by: STUDENT IN AN ORGANIZED HEALTH CARE EDUCATION/TRAINING PROGRAM

## 2022-05-20 PROCEDURE — 36415 COLL VENOUS BLD VENIPUNCTURE: CPT

## 2022-05-20 PROCEDURE — 86900 BLOOD TYPING SEROLOGIC ABO: CPT

## 2022-05-20 PROCEDURE — 1220000001 HC SEMI PRIVATE L&D R&B

## 2022-05-20 PROCEDURE — 6360000002 HC RX W HCPCS: Performed by: OBSTETRICS & GYNECOLOGY

## 2022-05-20 RX ORDER — SODIUM CHLORIDE, SODIUM LACTATE, POTASSIUM CHLORIDE, AND CALCIUM CHLORIDE .6; .31; .03; .02 G/100ML; G/100ML; G/100ML; G/100ML
500 INJECTION, SOLUTION INTRAVENOUS PRN
Status: DISCONTINUED | OUTPATIENT
Start: 2022-05-20 | End: 2022-05-21

## 2022-05-20 RX ORDER — SODIUM CHLORIDE 0.9 % (FLUSH) 0.9 %
5-40 SYRINGE (ML) INJECTION PRN
Status: DISCONTINUED | OUTPATIENT
Start: 2022-05-20 | End: 2022-05-21

## 2022-05-20 RX ORDER — DOCUSATE SODIUM 100 MG/1
100 CAPSULE, LIQUID FILLED ORAL 2 TIMES DAILY
Status: DISCONTINUED | OUTPATIENT
Start: 2022-05-20 | End: 2022-05-21

## 2022-05-20 RX ORDER — SODIUM CHLORIDE, SODIUM LACTATE, POTASSIUM CHLORIDE, CALCIUM CHLORIDE 600; 310; 30; 20 MG/100ML; MG/100ML; MG/100ML; MG/100ML
INJECTION, SOLUTION INTRAVENOUS CONTINUOUS
Status: DISCONTINUED | OUTPATIENT
Start: 2022-05-20 | End: 2022-05-21

## 2022-05-20 RX ORDER — SODIUM CHLORIDE 0.9 % (FLUSH) 0.9 %
5-40 SYRINGE (ML) INJECTION EVERY 12 HOURS SCHEDULED
Status: DISCONTINUED | OUTPATIENT
Start: 2022-05-20 | End: 2022-05-21

## 2022-05-20 RX ORDER — ONDANSETRON 2 MG/ML
4 INJECTION INTRAMUSCULAR; INTRAVENOUS EVERY 6 HOURS PRN
Status: DISCONTINUED | OUTPATIENT
Start: 2022-05-20 | End: 2022-05-21

## 2022-05-20 RX ORDER — SODIUM CHLORIDE 9 MG/ML
25 INJECTION, SOLUTION INTRAVENOUS PRN
Status: DISCONTINUED | OUTPATIENT
Start: 2022-05-20 | End: 2022-05-21

## 2022-05-20 RX ORDER — ONDANSETRON 4 MG/1
4 TABLET, ORALLY DISINTEGRATING ORAL EVERY 8 HOURS PRN
Status: DISCONTINUED | OUTPATIENT
Start: 2022-05-20 | End: 2022-05-21

## 2022-05-20 RX ORDER — SODIUM CHLORIDE, SODIUM LACTATE, POTASSIUM CHLORIDE, AND CALCIUM CHLORIDE .6; .31; .03; .02 G/100ML; G/100ML; G/100ML; G/100ML
1000 INJECTION, SOLUTION INTRAVENOUS PRN
Status: DISCONTINUED | OUTPATIENT
Start: 2022-05-20 | End: 2022-05-21

## 2022-05-20 RX ORDER — ACETAMINOPHEN 325 MG/1
650 TABLET ORAL EVERY 4 HOURS PRN
Status: DISCONTINUED | OUTPATIENT
Start: 2022-05-20 | End: 2022-05-21

## 2022-05-20 RX ORDER — ONDANSETRON 2 MG/ML
4 INJECTION INTRAMUSCULAR; INTRAVENOUS EVERY 6 HOURS PRN
Status: DISCONTINUED | OUTPATIENT
Start: 2022-05-20 | End: 2022-05-20 | Stop reason: SDUPTHER

## 2022-05-20 RX ADMIN — VANCOMYCIN HYDROCHLORIDE 2000 MG: 1 INJECTION, POWDER, LYOPHILIZED, FOR SOLUTION INTRAVENOUS at 20:17

## 2022-05-20 RX ADMIN — VANCOMYCIN HYDROCHLORIDE 2000 MG: 1 INJECTION, POWDER, LYOPHILIZED, FOR SOLUTION INTRAVENOUS at 11:32

## 2022-05-20 RX ADMIN — SODIUM CHLORIDE, POTASSIUM CHLORIDE, SODIUM LACTATE AND CALCIUM CHLORIDE: 600; 310; 30; 20 INJECTION, SOLUTION INTRAVENOUS at 11:31

## 2022-05-20 RX ADMIN — BUTORPHANOL TARTRATE 2 MG: 2 INJECTION, SOLUTION INTRAMUSCULAR; INTRAVENOUS at 22:01

## 2022-05-20 RX ADMIN — Medication 1 MILLI-UNITS/MIN: at 21:45

## 2022-05-20 ASSESSMENT — PAIN DESCRIPTION - LOCATION: LOCATION: ABDOMEN

## 2022-05-20 ASSESSMENT — PAIN - FUNCTIONAL ASSESSMENT: PAIN_FUNCTIONAL_ASSESSMENT: ACTIVITIES ARE NOT PREVENTED

## 2022-05-20 ASSESSMENT — PAIN DESCRIPTION - DESCRIPTORS: DESCRIPTORS: CRAMPING

## 2022-05-20 ASSESSMENT — PAIN SCALES - GENERAL: PAINLEVEL_OUTOF10: 7

## 2022-05-20 ASSESSMENT — PAIN DESCRIPTION - ORIENTATION: ORIENTATION: LOWER

## 2022-05-20 NOTE — PROGRESS NOTES
Assumed pt care. States she is feeling her contractions, rates them as a 7 out of 10. States she had a small amount of bleeding when her water broke this morning. States +FM.

## 2022-05-20 NOTE — H&P
Not on file   Other Topics Concern    Not on file   Social History Narrative    Not on file     Social Determinants of Health     Financial Resource Strain: Low Risk     Difficulty of Paying Living Expenses: Not hard at all   Food Insecurity: No Food Insecurity    Worried About Running Out of Food in the Last Year: Never true    [address] in the Last Year: Never true   Transportation Needs:     Lack of Transportation (Medical): Not on file    Lack of Transportation (Non-Medical):  Not on file   Physical Activity:     Days of Exercise per Week: Not on file    Minutes of Exercise per Session: Not on file   Stress:     Feeling of Stress : Not on file   Social Connections:     Frequency of Communication with Friends and Family: Not on file    Frequency of Social Gatherings with Friends and Family: Not on file    Attends Shinto Services: Not on file    Active Member of [address] [place] [address] [employer] or Organizations: Not on file    Attends Club or Organization Meetings: Not on file    Marital Status: Not on file   Intimate Partner Violence:     Fear of Current or Ex-Partner: Not on file    Emotionally Abused: Not on file    Physically Abused: Not on file    Sexually Abused: Not on file   Housing Stability:     Unable to Pay for Housing in the Last Year: Not on file    Number of [place] in the Last Year: Not on file    Unstable Housing in the Last Year: Not on file     Family History:       Problem Relation Age of Onset    Other Cancer Mother      Medications Prior to Admission:  Medications Prior to Admission: acetaminophen (TYLENOL) 500 MG tablet, Take 1 tablet by mouth 4 times daily as needed for Pain  docusate sodium (COLACE) 100 MG capsule, Take 100 mg by mouth daily  Prenatal Vit-Fe Fumarate-FA (PRENATAL VITAMIN PO), Take by mouth    REVIEW OF SYSTEMS:    CONSTITUTIONAL:  negative  RESPIRATORY:  negative  CARDIOVASCULAR:  negative  GASTROINTESTINAL:  negative  ALLERGIC/IMMUNOLOGIC: negative  NEUROLOGICAL:  negative  BEHAVIOR/PSYCH:  negative    PHYSICAL EXAM:  Vitals:    05/20/22 0900   Weight: 229 lb (103.9 kg)   Height: 5' 3\" (1.6 m)     General appearance:  awake, alert, cooperative, no apparent distress, and appears stated age  Neurologic:  Awake, alert, oriented to name, place and time. Lungs:  No increased work of breathing, good air exchange  Abdomen:  Soft, non tender, gravid, consistent with her gestational age, EFW by Leopald's manouever was 3000   Fetal heart rate:  Reassuring.   Pelvis:  Adequate pelvis  Cervix: 1 cm 75% medium -2  Contraction frequency:  irregular    Membranes:  Ruptured clear fluid    ASSESSMENT AND PLAN:    IOL per MFM, Dr. Tan Le and Dr. Eleanor Zamorano for GBS unknown and PCN allergy  Speculum exam done, no herpetic lesions in vagina or cervix    Labor: Admit, anticipate normal delivery, routine labor orders  Fetus: Reassuring  GBS: unknown

## 2022-05-20 NOTE — PROGRESS NOTES
Pt presents to antepartum  34w4d with complaints of lof that startes at 0800, states it was a big gush of fluid, and feels come contractions. Denies any vb, and states good fetal movement. Placed on efm and amni sure done results are positive.

## 2022-05-21 ENCOUNTER — ANESTHESIA EVENT (OUTPATIENT)
Dept: LABOR AND DELIVERY | Age: 32
End: 2022-05-21
Payer: COMMERCIAL

## 2022-05-21 ENCOUNTER — ANESTHESIA (OUTPATIENT)
Dept: LABOR AND DELIVERY | Age: 32
End: 2022-05-21
Payer: COMMERCIAL

## 2022-05-21 LAB
AMPHETAMINE SCREEN, URINE: NOT DETECTED
BARBITURATE SCREEN URINE: NOT DETECTED
BENZODIAZEPINE SCREEN, URINE: NOT DETECTED
CANNABINOID SCREEN URINE: NOT DETECTED
COCAINE METABOLITE SCREEN URINE: NOT DETECTED
FENTANYL SCREEN, URINE: NOT DETECTED
Lab: NORMAL
METHADONE SCREEN, URINE: NOT DETECTED
OPIATE SCREEN URINE: NOT DETECTED
OXYCODONE URINE: NOT DETECTED
PHENCYCLIDINE SCREEN URINE: NOT DETECTED

## 2022-05-21 PROCEDURE — 2500000003 HC RX 250 WO HCPCS: Performed by: ANESTHESIOLOGY

## 2022-05-21 PROCEDURE — 80307 DRUG TEST PRSMV CHEM ANLYZR: CPT

## 2022-05-21 PROCEDURE — 2580000003 HC RX 258: Performed by: OBSTETRICS & GYNECOLOGY

## 2022-05-21 PROCEDURE — 88307 TISSUE EXAM BY PATHOLOGIST: CPT

## 2022-05-21 PROCEDURE — 0HQ9XZZ REPAIR PERINEUM SKIN, EXTERNAL APPROACH: ICD-10-PCS | Performed by: OBSTETRICS & GYNECOLOGY

## 2022-05-21 PROCEDURE — 90715 TDAP VACCINE 7 YRS/> IM: CPT | Performed by: OBSTETRICS & GYNECOLOGY

## 2022-05-21 PROCEDURE — 2500000003 HC RX 250 WO HCPCS: Performed by: NURSE ANESTHETIST, CERTIFIED REGISTERED

## 2022-05-21 PROCEDURE — 6370000000 HC RX 637 (ALT 250 FOR IP): Performed by: OBSTETRICS & GYNECOLOGY

## 2022-05-21 PROCEDURE — 7200000001 HC VAGINAL DELIVERY

## 2022-05-21 PROCEDURE — 51701 INSERT BLADDER CATHETER: CPT

## 2022-05-21 PROCEDURE — 1220000000 HC SEMI PRIVATE OB R&B

## 2022-05-21 PROCEDURE — 3700000025 EPIDURAL BLOCK: Performed by: ANESTHESIOLOGY

## 2022-05-21 PROCEDURE — 90471 IMMUNIZATION ADMIN: CPT | Performed by: OBSTETRICS & GYNECOLOGY

## 2022-05-21 PROCEDURE — 6360000002 HC RX W HCPCS: Performed by: OBSTETRICS & GYNECOLOGY

## 2022-05-21 RX ORDER — ONDANSETRON 2 MG/ML
4 INJECTION INTRAMUSCULAR; INTRAVENOUS EVERY 6 HOURS PRN
Status: DISCONTINUED | OUTPATIENT
Start: 2022-05-21 | End: 2022-05-21

## 2022-05-21 RX ORDER — LIDOCAINE HYDROCHLORIDE 10 MG/ML
INJECTION, SOLUTION INFILTRATION; PERINEURAL
Status: COMPLETED
Start: 2022-05-21 | End: 2022-05-21

## 2022-05-21 RX ORDER — FERROUS SULFATE 325(65) MG
325 TABLET ORAL 2 TIMES DAILY WITH MEALS
Status: DISCONTINUED | OUTPATIENT
Start: 2022-05-21 | End: 2022-05-23 | Stop reason: HOSPADM

## 2022-05-21 RX ORDER — LIDOCAINE HYDROCHLORIDE AND EPINEPHRINE 15; 5 MG/ML; UG/ML
INJECTION, SOLUTION EPIDURAL PRN
Status: DISCONTINUED | OUTPATIENT
Start: 2022-05-21 | End: 2022-05-23 | Stop reason: SDUPTHER

## 2022-05-21 RX ORDER — ACETAMINOPHEN 325 MG/1
650 TABLET ORAL EVERY 4 HOURS PRN
Status: DISCONTINUED | OUTPATIENT
Start: 2022-05-21 | End: 2022-05-23 | Stop reason: HOSPADM

## 2022-05-21 RX ORDER — NALOXONE HYDROCHLORIDE 0.4 MG/ML
INJECTION, SOLUTION INTRAMUSCULAR; INTRAVENOUS; SUBCUTANEOUS PRN
Status: DISCONTINUED | OUTPATIENT
Start: 2022-05-21 | End: 2022-05-21

## 2022-05-21 RX ORDER — SIMETHICONE 80 MG
80 TABLET,CHEWABLE ORAL EVERY 6 HOURS PRN
Status: DISCONTINUED | OUTPATIENT
Start: 2022-05-21 | End: 2022-05-23 | Stop reason: HOSPADM

## 2022-05-21 RX ORDER — ACETAMINOPHEN 650 MG
TABLET, EXTENDED RELEASE ORAL
Status: COMPLETED
Start: 2022-05-21 | End: 2022-05-21

## 2022-05-21 RX ORDER — ONDANSETRON 2 MG/ML
4 INJECTION INTRAMUSCULAR; INTRAVENOUS EVERY 6 HOURS PRN
Status: DISCONTINUED | OUTPATIENT
Start: 2022-05-21 | End: 2022-05-23 | Stop reason: HOSPADM

## 2022-05-21 RX ORDER — HYDROCODONE BITARTRATE AND ACETAMINOPHEN 5; 325 MG/1; MG/1
1 TABLET ORAL EVERY 6 HOURS PRN
Status: DISCONTINUED | OUTPATIENT
Start: 2022-05-21 | End: 2022-05-23 | Stop reason: HOSPADM

## 2022-05-21 RX ORDER — ONDANSETRON 4 MG/1
8 TABLET, ORALLY DISINTEGRATING ORAL EVERY 8 HOURS PRN
Status: DISCONTINUED | OUTPATIENT
Start: 2022-05-21 | End: 2022-05-23 | Stop reason: HOSPADM

## 2022-05-21 RX ORDER — DOCUSATE SODIUM 100 MG/1
100 CAPSULE, LIQUID FILLED ORAL 2 TIMES DAILY
Status: DISCONTINUED | OUTPATIENT
Start: 2022-05-21 | End: 2022-05-23 | Stop reason: HOSPADM

## 2022-05-21 RX ORDER — BISACODYL 10 MG
10 SUPPOSITORY, RECTAL RECTAL DAILY PRN
Status: DISCONTINUED | OUTPATIENT
Start: 2022-05-21 | End: 2022-05-23 | Stop reason: HOSPADM

## 2022-05-21 RX ORDER — IBUPROFEN 800 MG/1
800 TABLET ORAL EVERY 8 HOURS PRN
Status: DISCONTINUED | OUTPATIENT
Start: 2022-05-21 | End: 2022-05-23 | Stop reason: HOSPADM

## 2022-05-21 RX ORDER — LIDOCAINE HYDROCHLORIDE 10 MG/ML
INJECTION, SOLUTION EPIDURAL; INFILTRATION; INTRACAUDAL; PERINEURAL PRN
Status: DISCONTINUED | OUTPATIENT
Start: 2022-05-21 | End: 2022-05-23 | Stop reason: SDUPTHER

## 2022-05-21 RX ORDER — MODIFIED LANOLIN
OINTMENT (GRAM) TOPICAL PRN
Status: DISCONTINUED | OUTPATIENT
Start: 2022-05-21 | End: 2022-05-23 | Stop reason: HOSPADM

## 2022-05-21 RX ADMIN — Medication 166.7 ML: at 05:51

## 2022-05-21 RX ADMIN — Medication: at 05:18

## 2022-05-21 RX ADMIN — IBUPROFEN 800 MG: 800 TABLET, FILM COATED ORAL at 16:28

## 2022-05-21 RX ADMIN — SODIUM CHLORIDE, POTASSIUM CHLORIDE, SODIUM LACTATE AND CALCIUM CHLORIDE: 600; 310; 30; 20 INJECTION, SOLUTION INTRAVENOUS at 02:24

## 2022-05-21 RX ADMIN — Medication 5 ML: at 02:40

## 2022-05-21 RX ADMIN — Medication 15 ML/HR: at 02:39

## 2022-05-21 RX ADMIN — DOCUSATE SODIUM 100 MG: 100 CAPSULE, LIQUID FILLED ORAL at 21:16

## 2022-05-21 RX ADMIN — LIDOCAINE HYDROCHLORIDE,EPINEPHRINE BITARTRATE 3 ML: 15; .005 INJECTION, SOLUTION EPIDURAL; INFILTRATION; INTRACAUDAL; PERINEURAL at 02:33

## 2022-05-21 RX ADMIN — TETANUS TOXOID, REDUCED DIPHTHERIA TOXOID AND ACELLULAR PERTUSSIS VACCINE, ADSORBED 0.5 ML: 5; 2.5; 8; 8; 2.5 SUSPENSION INTRAMUSCULAR at 16:29

## 2022-05-21 RX ADMIN — LIDOCAINE HYDROCHLORIDE 2 ML: 10 INJECTION, SOLUTION EPIDURAL; INFILTRATION; INTRACAUDAL; PERINEURAL at 02:29

## 2022-05-21 RX ADMIN — SODIUM CHLORIDE, SODIUM LACTATE, POTASSIUM CHLORIDE, AND CALCIUM CHLORIDE 1000 ML: .6; .31; .03; .02 INJECTION, SOLUTION INTRAVENOUS at 01:20

## 2022-05-21 RX ADMIN — Medication 3 ML: at 02:44

## 2022-05-21 RX ADMIN — DOCUSATE SODIUM 100 MG: 100 CAPSULE, LIQUID FILLED ORAL at 10:40

## 2022-05-21 ASSESSMENT — PAIN DESCRIPTION - DESCRIPTORS: DESCRIPTORS: CRAMPING;DISCOMFORT;DULL

## 2022-05-21 ASSESSMENT — PAIN DESCRIPTION - LOCATION: LOCATION: ABDOMEN

## 2022-05-21 ASSESSMENT — PAIN SCALES - GENERAL: PAINLEVEL_OUTOF10: 3

## 2022-05-21 NOTE — PROGRESS NOTES
Updated Dr. Simon Robison patient is now 8/90/-1 and comfortable with her epidural. No new orders at this time.

## 2022-05-21 NOTE — L&D DELIVERY NOTE
27yo  @ 34+ wks delivered via spontaneous vaginal delivery under epidural anesthesia over no episiotomy a female infant at 0547 weighing pending apgars 8,9. 30 second cord clamp delay performed for 1 minute per NICU. Placenta with 3VC intact. Baby bulb suctioned and cord blood and gases obtained. Small 1 cm first degree repair with 3-0 vicryl. ebl 100cc. Should delivered without difficulty.

## 2022-05-21 NOTE — ANESTHESIA PRE PROCEDURE
Department of Anesthesiology  Preprocedure Note       Name:  Ariadne Diaz   Age:  32 y.o.  :  1990                                          MRN:  [id number]         Date:  2022      Surgeon: Nathan Gardner    Procedure: labor analgesia    Medications prior to admission:   Prior to Admission medications    Medication Sig Start Date End Date Taking?  Authorizing Provider   acetaminophen (TYLENOL) 500 MG tablet Take 1 tablet by mouth 4 times daily as needed for Pain 22   Phuong Pila, DO   docusate sodium (COLACE) 100 MG capsule Take 100 mg by mouth daily    Historical Provider, MD   Prenatal Vit-Fe Fumarate-FA (PRENATAL VITAMIN PO) Take by mouth    Historical Provider, MD       Current medications:    Current Facility-Administered Medications   Medication Dose Route Frequency Provider Last Rate Last Admin    naloxone 0.4 mg in 10 mL sodium chloride syringe   IntraVENous PRN Ruben T Jhonatan, DO        ondansetron (ZOFRAN) injection 4 mg  4 mg IntraVENous Q6H PRN Ruben T Jhonatan, DO        fentaNYL 1.85mcg/ml and Bupivicaine 0.1% in 0.9% NS 135ml infusion (OB) epidural  15 mL/hr Epidural Continuous Ruben T Jhonatan, DO        ondansetron (ZOFRAN-ODT) disintegrating tablet 4 mg  4 mg Oral Q8H PRN Valarie Im, DO        acetaminophen (TYLENOL) tablet 650 mg  650 mg Oral Q4H PRN Valarie Im, DO        lactated ringers infusion   IntraVENous Continuous Luberta Mayberry,  mL/hr at 22 1131 New Bag at 22 1131    lactated ringers bolus  500 mL IntraVENous PRN Luberta Mayberry, DO        Or    lactated ringers bolus  1,000 mL IntraVENous PRN Luberta Mayberry, DO        sodium chloride flush 0.9 % injection 5-40 mL  5-40 mL IntraVENous 2 times per day Luberta Mayberry, DO        sodium chloride flush 0.9 % injection 5-40 mL  5-40 mL IntraVENous PRN Luberta Mayberry, DO        0.9 % sodium chloride infusion  25 mL IntraVENous PRN Luberta Mayberry, DO        oxytocin (PITOCIN) 30 units in 500 mL infusion  87.3 maricruz-units/min IntraVENous Continuous PRN Donalee Jaqueliner, DO        And    oxytocin (PITOCIN) 10 unit bolus from the bag  10 Units IntraVENous PRN Donalee Jaqueliner, DO        ondansetron [facility]) injection 4 mg  4 mg IntraVENous Q6H PRN Donalee Mariner, DO        docusate sodium (COLACE) capsule 100 mg  100 mg Oral BID Donalee Mariner, DO        oxytocin (PITOCIN) 30 units in 500 mL infusion  1-20 maricruz-units/min IntraVENous Continuous Lakeshia Natty, DO 8 mL/hr at 22 0120 8 maricruz-units/min at 22 0120    butorphanol (STADOL) injection 2 mg  2 mg IntraVENous Q3H PRN Lakeshia Natty, DO   2 mg at 22 2201    vancomycin 2000 mg in dextrose 5% 500 ml IVPB  20 mg/kg IntraVENous Q12H Lakeshia Natty, DO           Allergies:     Allergies   Allergen Reactions    Latex Hives and Itching    Penicillins Hives       Problem List:    Patient Active Problem List   Diagnosis Code    Chorioamnionitis in second trimester, fetus 3 O38.12    Chorioamnionitis in first trimester O41.1210    Vaginal discharge N89.8    Abdominal cramping affecting pregnancy, antepartum O26.899, R10.9    Fetal heart rate decelerations affecting management of mother P20.9149    Positive fetal fibronectin at 22 weeks to 34 weeks gestation O09.899, R87.89    PROM (premature rupture of membranes) O42.90    Normal pregnancy in third trimester Z34.93       Past Medical History:        Diagnosis Date    Missed      Pregnancy     21-7 weeks    Rectal bleed        Past Surgical History:        Procedure Laterality Date    DILATION AND CURETTAGE OF UTERUS  2021    INSERT MIDLINE CATHETER  2022         LAPAROSCOPY  2014    WISDOM TOOTH EXTRACTION         Social History:    Social History     Tobacco Use    Smoking status: Never Smoker    Smokeless tobacco: Never Used   Substance Use Topics    Alcohol use: Not Currently                                Counseling given: Not Answered      Vital Signs (Current):   Vitals:    05/21/22 0020 05/21/22 0050 05/21/22 0122 05/21/22 0153   BP: 132/78 127/77 132/82 134/79   Pulse: 75 82 79 88   Resp:       Temp:       TempSrc:       SpO2:       Weight:       Height:                                                  BP Readings from Last 3 Encounters:   05/21/22 134/79   05/10/22 106/65   02/26/22 111/71       NPO Status:                                                                                 BMI:   Wt Readings from Last 3 Encounters:   05/20/22 229 lb (103.9 kg)   02/20/22 222 lb (100.7 kg)   02/17/22 223 lb (101.2 kg)     Body mass index is 40.57 kg/m². CBC:   Lab Results   Component Value Date    WBC 13.6 05/20/2022    RBC 4.16 05/20/2022    HGB 12.8 05/20/2022    HCT 38.6 05/20/2022    MCV 92.8 05/20/2022    RDW 13.0 05/20/2022     05/20/2022       CMP:   Lab Results   Component Value Date     03/14/2022    K 3.4 03/14/2022    K 3.4 02/19/2022     03/14/2022    CO2 23 03/14/2022    BUN 4 03/14/2022    CREATININE 0.5 03/14/2022    GFRAA >60 03/14/2022    LABGLOM >60 03/14/2022    GLUCOSE 76 03/14/2022    PROT 6.4 03/14/2022    CALCIUM 9.0 03/14/2022    BILITOT 0.2 03/14/2022    ALKPHOS 65 03/14/2022    AST 15 03/14/2022    ALT 14 03/14/2022       POC Tests: No results for input(s): POCGLU, POCNA, POCK, POCCL, POCBUN, POCHEMO, POCHCT in the last 72 hours.     Coags: No results found for: PROTIME, INR, APTT    HCG (If Applicable): No results found for: PREGTESTUR, PREGSERUM, HCG, HCGQUANT     ABGs: No results found for: PHART, PO2ART, SJT1RLR, RNC3DYL, BEART, G4ROJACV     Type & Screen (If Applicable):  No results found for: LABABO, LABRH    Drug/Infectious Status (If Applicable):  No results found for: HIV, HEPCAB    COVID-19 Screening (If Applicable):   Lab Results   Component Value Date    COVID19 Not Detected 02/19/2022           Anesthesia Evaluation  Patient summary reviewed no history of anesthetic complications:   Airway: Mallampati: III  TM distance: >3 FB   Neck ROM: full  Mouth opening: > = 3 FB Dental: normal exam         Pulmonary:Negative Pulmonary ROS and normal exam                               Cardiovascular:Negative CV ROS                      Neuro/Psych:   Negative Neuro/Psych ROS              GI/Hepatic/Renal:            ROS comment: Hx of rectal bleeding.  (occassional red blood, pt has a hemorrhoid, pt to schedule colonoscopy post partum). Endo/Other: Negative Endo/Other ROS                    Abdominal:              PE comment: gravid   Vascular: negative vascular ROS. Other Findings:           Anesthesia Plan      general, spinal and epidural     ASA 2             Anesthetic plan and risks discussed with patient. Use of blood products discussed with patient whom consented to blood products. Plan discussed with attending.                   Pawan Waters, APRN - CRNA   5/21/2022

## 2022-05-21 NOTE — ANESTHESIA PROCEDURE NOTES
Epidural Block    Patient location during procedure: OB  Start time: 5/21/2022 2:21 AM  End time: 5/21/2022 2:46 AM  Reason for block: labor epidural  Staffing  Performed: resident/CRNA   Anesthesiologist: Marito Conley DO  Resident/CRNA: NICK Pearce - CRNA  Preanesthetic Checklist  Completed: patient identified, IV checked, site marked, risks and benefits discussed, surgical consent, monitors and equipment checked, pre-op evaluation, timeout performed, anesthesia consent given, oxygen available and patient being monitored  Epidural  Patient position: sitting  Prep: ChloraPrep and site prepped and draped  Patient monitoring: continuous pulse ox and frequent blood pressure checks  Approach: midline  Location: L3-4  Injection technique: SANDY air  Provider prep: mask and sterile gloves  Needle  Needle type: Tuohy   Needle gauge: 18 G  Needle length: 3.5 in  Needle insertion depth: 6 cm  Catheter type: multi-orifice  Catheter size: 20 G.   Catheter at skin depth: 12.5 cm  Test dose: negativeCatheter Secured: tegaderm and tape  Assessment  Hemodynamics: stable  Attempts: 1Outcomes: patient tolerated procedure well

## 2022-05-21 NOTE — PROGRESS NOTES
Pt admitted to 306 from L&D, oriented to room and call light, physical assessment as charted, pt resting quietly with no complaints

## 2022-05-21 NOTE — PROGRESS NOTES
NORMAL  OF A VIABLE BABYGIRL @0547. APGARS 8/9 PER NICU. MOTHER IN STABLE CONDITION, BABY TO NICU FOR GA.

## 2022-05-22 LAB
HCT VFR BLD CALC: 35.5 % (ref 34–48)
HEMOGLOBIN: 11.8 G/DL (ref 11.5–15.5)

## 2022-05-22 PROCEDURE — 6370000000 HC RX 637 (ALT 250 FOR IP): Performed by: OBSTETRICS & GYNECOLOGY

## 2022-05-22 PROCEDURE — 85018 HEMOGLOBIN: CPT

## 2022-05-22 PROCEDURE — 1220000000 HC SEMI PRIVATE OB R&B

## 2022-05-22 PROCEDURE — 85014 HEMATOCRIT: CPT

## 2022-05-22 PROCEDURE — 36415 COLL VENOUS BLD VENIPUNCTURE: CPT

## 2022-05-22 RX ADMIN — DOCUSATE SODIUM 100 MG: 100 CAPSULE, LIQUID FILLED ORAL at 20:39

## 2022-05-22 RX ADMIN — DOCUSATE SODIUM 100 MG: 100 CAPSULE, LIQUID FILLED ORAL at 11:00

## 2022-05-22 RX ADMIN — IBUPROFEN 800 MG: 800 TABLET, FILM COATED ORAL at 11:00

## 2022-05-22 NOTE — PROGRESS NOTES
Progress Note    SUBJECTIVE:   Pt comfortable; +void; +flatus; +ambulation; decreased vaginal bleeding    OBJECTIVE:    VITALS:  /67   Pulse 82   Temp 98.6 °F (37 °C) (Oral)   Resp 18   Ht 5' 3\" (1.6 m)   Wt 229 lb (103.9 kg)   LMP 2021 (Exact Date)   SpO2 100%   Breastfeeding Unknown   BMI 40.57 kg/m²   Physical Exam  ABDOMEN:  normal bowel sounds, non-distended, non-tender and uterus firm  Ext nt    DATA:      ASSESSMENT AND PLAN:  S/p   Principal Problem:    PROM (premature rupture of membranes)  Active Problems:    Normal pregnancy in third trimester  Resolved Problems:    * No resolved hospital problems.  *      PPD#1  Plan discharge home tomorrow

## 2022-05-22 NOTE — ANESTHESIA POSTPROCEDURE EVALUATION
Department of Anesthesiology  Postprocedure Note    Patient: Wil Garcia  MRN: [id number]  YOB: 1990  Date of evaluation: 5/22/2022  Time:  5:34 PM     Procedure Summary     Date: 05/21/22 Room / Location:     Anesthesia Start: 0221 Anesthesia Stop:     Procedure: Labor Analgesia Diagnosis:     Scheduled Providers:  Responsible Provider: Laura Carl DO    Anesthesia Type: general, spinal, epidural ASA Status: 2          Anesthesia Type: No value filed. Nathan Phase I:      Nathan Phase II:      Last vitals: Reviewed and per EMR flowsheets.        Anesthesia Post Evaluation    Patient location during evaluation: bedside (mom/baby room 306 epidural)  Patient participation: complete - patient participated  Level of consciousness: awake  Pain score: 0  Airway patency: patent  Nausea & Vomiting: no nausea and no vomiting  Complications: no  Cardiovascular status: hemodynamically stable  Respiratory status: acceptable  Hydration status: stable

## 2022-05-22 NOTE — PLAN OF CARE
Problem: Discharge Planning  Goal: Discharge to home or other facility with appropriate resources  Outcome: Completed     Problem: Pain  Goal: Verbalizes/displays adequate comfort level or baseline comfort level  5/22/2022 1121 by Marybel Kidd RN  Outcome: Completed  5/22/2022 0121 by Pina Kelly RN  Outcome: Progressing

## 2022-05-22 NOTE — PLAN OF CARE
Problem: Discharge Planning  Goal: Discharge to home or other facility with appropriate resources  Outcome: Completed     Problem: Pain  Goal: Verbalizes/displays adequate comfort level or baseline comfort level  5/22/2022 1121 by Dakota Palacios RN  Outcome: Completed  5/22/2022 0121 by Nneka Canada RN  Outcome: Progressing

## 2022-05-23 VITALS
DIASTOLIC BLOOD PRESSURE: 74 MMHG | HEIGHT: 63 IN | OXYGEN SATURATION: 97 % | BODY MASS INDEX: 40.57 KG/M2 | WEIGHT: 229 LBS | HEART RATE: 98 BPM | SYSTOLIC BLOOD PRESSURE: 128 MMHG | TEMPERATURE: 98.7 F | RESPIRATION RATE: 18 BRPM

## 2022-05-23 PROCEDURE — 6370000000 HC RX 637 (ALT 250 FOR IP): Performed by: OBSTETRICS & GYNECOLOGY

## 2022-05-23 RX ADMIN — IBUPROFEN 800 MG: 800 TABLET, FILM COATED ORAL at 07:37

## 2022-05-23 RX ADMIN — Medication: at 14:02

## 2022-05-23 RX ADMIN — DOCUSATE SODIUM 100 MG: 100 CAPSULE, LIQUID FILLED ORAL at 14:03

## 2022-05-23 ASSESSMENT — PAIN DESCRIPTION - DESCRIPTORS: DESCRIPTORS: CRAMPING

## 2022-05-23 ASSESSMENT — PAIN DESCRIPTION - ORIENTATION: ORIENTATION: LOWER

## 2022-05-23 ASSESSMENT — PAIN DESCRIPTION - LOCATION: LOCATION: ABDOMEN

## 2022-05-23 ASSESSMENT — PAIN SCALES - GENERAL: PAINLEVEL_OUTOF10: 2

## 2022-05-23 NOTE — PROGRESS NOTES
Postpartum Day 2: Vaginal Delivery    The patient feels well. Pain is well controlled with current medications. Baby is feeding via breast.     Objective:        Vitals:    05/22/22 2251   BP: 106/65   Pulse: 86   Resp: 16   Temp: 98.7 °F (37.1 °C)   SpO2: 99%         Lab Results   Component Value Date    WBC 13.6 (H) 05/20/2022    HGB 11.8 05/22/2022    HCT 35.5 05/22/2022    MCV 92.8 05/20/2022     05/20/2022       General:    alert, appears stated age and cooperative   Lochia:  appropriate   Uterine    firm   DVT Evaluation:  No evidence of DVT seen on physical exam.     Assessment:     Status post Vaginal Delivery. good    Plan:     Discharge home with standard precautions and return to clinic in 4-6 weeks.

## 2022-05-23 NOTE — FLOWSHEET NOTE
Patient discharged to home in stable condition via ambulation to NICU to visit infant. Patient accompanied to NICU by FOGEOFFREY.

## 2022-05-23 NOTE — FLOWSHEET NOTE
During bedside shift report this RN instructed patient to call this RN for assessment prior to visiting infant in NICU today with verbalized understanding.

## 2022-07-18 NOTE — DISCHARGE SUMMARY
Obstetrical Discharge Form    Gestational Age:34w5d    Antepartum complications:  labor,  rupture of membranes    Date of Delivery: 22    Type of Delivery: vaginal, spontaneous    Delivered By:           Darrol Side:   Information for the patient's :  Martinez Dos Santos [[id number]]      Anesthesia: Epidural    Intrapartum complications:  Labor and PROM    Feeding method: breast    Postpartum complications: none    Discharge Date: 5/10/2022    Plan:   Follow up in 6 week(s)  DC'd to home in good condition.

## 2022-07-18 NOTE — DISCHARGE SUMMARY
Obstetrical Discharge Form    Gestational Age:34w5d    Antepartum complications:  labor    Date of Delivery: 22    Type of Delivery: vaginal, spontaneous    Delivered By:           Sneha Almendarez:   Information for the patient's :  Abelardo Bautista [[id number]]      Anesthesia: Epidural    Intrapartum complications:  Labor and PROM    Feeding method: breast    Postpartum complications: none    Discharge Date: 2022    Plan:   Follow up in 6 week(s)  DC'd to home in good condition.

## 2022-09-22 ENCOUNTER — OFFICE VISIT (OUTPATIENT)
Dept: FAMILY MEDICINE CLINIC | Age: 32
End: 2022-09-22
Payer: COMMERCIAL

## 2022-09-22 VITALS
RESPIRATION RATE: 18 BRPM | BODY MASS INDEX: 39.16 KG/M2 | OXYGEN SATURATION: 98 % | SYSTOLIC BLOOD PRESSURE: 142 MMHG | HEART RATE: 90 BPM | TEMPERATURE: 98.3 F | HEIGHT: 63 IN | DIASTOLIC BLOOD PRESSURE: 92 MMHG | WEIGHT: 221 LBS

## 2022-09-22 DIAGNOSIS — M79.672 LEFT FOOT PAIN: Primary | ICD-10-CM

## 2022-09-22 PROCEDURE — G8417 CALC BMI ABV UP PARAM F/U: HCPCS | Performed by: PHYSICIAN ASSISTANT

## 2022-09-22 PROCEDURE — G8427 DOCREV CUR MEDS BY ELIG CLIN: HCPCS | Performed by: PHYSICIAN ASSISTANT

## 2022-09-22 PROCEDURE — 1036F TOBACCO NON-USER: CPT | Performed by: PHYSICIAN ASSISTANT

## 2022-09-22 PROCEDURE — 99203 OFFICE O/P NEW LOW 30 MIN: CPT | Performed by: PHYSICIAN ASSISTANT

## 2022-09-22 NOTE — PROGRESS NOTES
22  Adan Holcomb : 1990 Sex: female  Age 28 y.o. Subjective:  Chief Complaint   Patient presents with    Foot Pain     Left, hurting for a couple of weeks         HPI:   Adan Holcomb , 28 y.o. female presents to express care for evaluation of left foot pain    HPI  26-year-old female presents to express care for evaluation of left foot pain. The patient has had this increased amount of pain discomfort over the dorsum of the foot. The patient states that she dropped her 's vape on her left foot. The patient states that since that time her children have stepped on it and just seem to really aggravate it. The patient has noted a little bit of swelling. Pain seems to be worse in the morning. No previous fractures or surgeries to the left foot or the left ankle. ROS:   Unless otherwise stated in this report the patient's positive and negative responses for review of systems for constitutional, eyes, ENT, cardiovascular, respiratory, gastrointestinal, neurological, , musculoskeletal, and integument systems and related systems to the presenting problem are either stated in the history of present illness or were not pertinent or were negative for the symptoms and/or complaints related to the presenting medical problem. Positives and pertinent negatives as per HPI. All others reviewed and are negative.       PMH:     Past Medical History:   Diagnosis Date    Missed      Pregnancy     21-7 weeks    Rectal bleed        Past Surgical History:   Procedure Laterality Date    DILATION AND CURETTAGE OF UTERUS  2021    INSERT MIDLINE CATHETER  2022         LAPAROSCOPY  2014    WISDOM TOOTH EXTRACTION         Family History   Problem Relation Age of Onset    Other Cancer Mother        Medications:     Current Outpatient Medications:     docusate sodium (COLACE) 100 MG capsule, Take 100 mg by mouth daily, Disp: , Rfl:     Prenatal Vit-Fe Fumarate-FA (PRENATAL VITAMIN PO), Take by mouth, Disp: , Rfl:     Allergies: Allergies   Allergen Reactions    Latex Hives and Itching    Penicillins Hives       Social History:     Social History     Tobacco Use    Smoking status: Never    Smokeless tobacco: Never   Vaping Use    Vaping Use: Never used   Substance Use Topics    Alcohol use: Not Currently    Drug use: Never       Patient lives at home. Physical Exam:     Vitals:    09/22/22 1559   BP: (!) 142/92   Site: Right Upper Arm   Position: Sitting   Cuff Size: Medium Adult   Pulse: 90   Resp: 18   Temp: 98.3 °F (36.8 °C)   TempSrc: Temporal   SpO2: 98%   Weight: 221 lb (100.2 kg)   Height: 5' 3\" (1.6 m)       Exam:  Physical Exam  Vital signs reviewed and nurse's notes. The patient is not hypoxic. General: Alert, no acute distress, patient resting comfortably   Skin: warm, intact, no pallor noted   Head: Normocephalic, atraumatic   Eye: Normal conjunctiva   Respiratory: No acute distress   Musculoskeletal: No obvious deformity noted to the left foot or the left ankle. The patient has no significant swelling currently. The patient's pulses intact the DP/PT 2+. The patient had normal capillary refill. The patient has pain mostly to the midfoot. No pain to the forefoot or to the hindfoot. No calcaneal squeeze tenderness. The patient had no palpable defect of the Achilles tendon. The patient had no cyanosis or mottling. No erythema or warmth. Neurological: alert and orient x4, normal sensory and motor observed. Psychiatric: Cooperative        Testing:     XR FOOT LEFT (MIN 3 VIEWS)    Result Date: 9/22/2022  EXAMINATION: THREE XRAY VIEWS OF THE LEFT FOOT 9/22/2022 4:10 pm COMPARISON: None. HISTORY: ORDERING SYSTEM PROVIDED HISTORY: Left foot pain TECHNOLOGIST PROVIDED HISTORY: Reason for exam:->left foot pain FINDINGS: Radiographs of the left foot demonstrate no fractures with preserved alignment. No erosive changes. No significant degenerative spurring. Anterior calcaneal enthesophyte. Osseous mineralization is normal.  No soft tissue abnormality. No acute osseous findings about the left foot. Normal alignment. Anterior calcaneal enthesophyte. Medical Decision Making:     Vital signs reviewed    Past medical history reviewed. Allergies reviewed. Medications reviewed. Patient on arrival does not appear to be in any apparent distress or discomfort. The patient has been seen and evaluated. The patient does not appear to be toxic or lethargic. X-rays obtained of the left foot. X-rays did not reveal any evidence of acute findings of the foot. The patient was placed in a cam walker. The patient was neurovascularly intact. The patient is to return to express care or go directly to the emergency department should any of the signs or symptoms worsen. The patient is to followup with primary care physician in 2-3 days for repeat evaluation. The patient has no other questions or concerns at this time the patient will be discharged home. Clinical Impression:   Alexi Mcmanus was seen today for foot pain. Diagnoses and all orders for this visit:    Left foot pain  -     XR FOOT LEFT (MIN 3 VIEWS); Future      The patient is to call for any concerns or return if any of the signs or symptoms worsen. The patient is to follow-up with PCP in the next 2-3 days for repeat evaluation repeat assessment or go directly to the emergency department.      SIGNATURE: Bailey Machado III, PA-C

## 2023-04-24 ENCOUNTER — E-VISIT (OUTPATIENT)
Dept: PRIMARY CARE CLINIC | Age: 33
End: 2023-04-24
Payer: COMMERCIAL

## 2023-04-24 DIAGNOSIS — A60.00 GENITAL HERPES SIMPLEX, UNSPECIFIED SITE: Primary | ICD-10-CM

## 2023-04-24 PROCEDURE — 99422 OL DIG E/M SVC 11-20 MIN: CPT | Performed by: NURSE PRACTITIONER

## 2023-04-24 RX ORDER — ACYCLOVIR 800 MG/1
800 TABLET ORAL 2 TIMES DAILY
Qty: 10 TABLET | Refills: 0 | Status: SHIPPED | OUTPATIENT
Start: 2023-04-24 | End: 2023-04-29

## 2023-04-24 NOTE — PROGRESS NOTES
Yaya Burgess (1990) initiated an asynchronous digital communication through [employer]. HPI: per patient questionnaire     Exam: not applicable    Diagnoses and all orders for this visit:  Diagnoses and all orders for this visit:    Genital herpes simplex, unspecified site    Other orders  -     acyclovir (ZOVIRAX) 800 MG tablet; Take 1 tablet by mouth 2 times daily for 5 days      F/u with pcp as needed    Time: EV2 - 11-20 minutes were spent on the digital evaluation and management of this patient.  15 min     NICK Villa - CNP

## 2024-02-14 ENCOUNTER — HOSPITAL ENCOUNTER (OUTPATIENT)
Age: 34
Discharge: HOME | End: 2024-02-16

## 2024-02-19 LAB — SURGICAL PATHOLOGY REPORT: NORMAL

## 2024-04-25 ENCOUNTER — E-VISIT (OUTPATIENT)
Dept: PRIMARY CARE CLINIC | Age: 34
End: 2024-04-25
Payer: COMMERCIAL

## 2024-04-25 DIAGNOSIS — R51.9 ACUTE NONINTRACTABLE HEADACHE, UNSPECIFIED HEADACHE TYPE: Primary | ICD-10-CM

## 2024-04-25 PROCEDURE — 99423 OL DIG E/M SVC 21+ MIN: CPT | Performed by: NURSE PRACTITIONER

## 2024-04-25 NOTE — PROGRESS NOTES
Belinda Rodriguez (1990) initiated an asynchronous digital communication through [employer].    HPI: per patient questionnaire     Exam: not applicable    Diagnoses and all orders for this visit:  Diagnoses and all orders for this visit:    Acute nonintractable headache, unspecified headache type    Denies cold sx. Typically does not get headaches that last this long. Recommend being evaluated in person.     Time: EV3 - 21 or more minutes were spent on the digital evaluation and management of this patient.22 min     Linda Campuzano, NCIK - CNP

## 2024-06-28 ENCOUNTER — E-VISIT (OUTPATIENT)
Dept: PRIMARY CARE CLINIC | Age: 34
End: 2024-06-28
Payer: COMMERCIAL

## 2024-06-28 DIAGNOSIS — N76.0 ACUTE VAGINITIS: Primary | ICD-10-CM

## 2024-06-28 PROCEDURE — 99423 OL DIG E/M SVC 21+ MIN: CPT | Performed by: NURSE PRACTITIONER

## 2024-06-28 NOTE — PROGRESS NOTES
Belinda Rodriguez (1990) initiated an asynchronous digital communication through [employer].    HPI: per patient questionnaire     Exam: not applicable    Diagnoses and all orders for this visit:  Diagnoses and all orders for this visit:    Acute vaginitis    Other orders  -     valACYclovir (VALTREX) 1 g tablet; Take 1 tablet by mouth daily for 5 days      Message sent to patient to clarify if she needs medication for her herpes. Waiting for response     Time: EV3 - 21 or more minutes were spent on the digital evaluation and management of this patient.25 min     Linda Campuzano, NICK - CNP

## 2024-06-29 RX ORDER — VALACYCLOVIR HYDROCHLORIDE 1 G/1
1000 TABLET, FILM COATED ORAL DAILY
Qty: 5 TABLET | Refills: 0 | Status: SHIPPED | OUTPATIENT
Start: 2024-06-29 | End: 2024-06-30

## 2024-06-30 RX ORDER — VALACYCLOVIR HYDROCHLORIDE 1 G/1
1000 TABLET, FILM COATED ORAL DAILY
Qty: 5 TABLET | Refills: 0 | Status: SHIPPED | OUTPATIENT
Start: 2024-06-30 | End: 2024-07-05

## 2024-09-09 NOTE — PROGRESS NOTES
Spoke with Dr. Nisha Morales, states she wants patient on antepartum unit. Call placed to clinical manager at this time for transfer.      Electronically signed by Chavez Addison RN on 2/20/2022 at 1:15 AM [Dear  ___] : Dear  [unfilled], [Consult Letter:] : I had the pleasure of evaluating your patient, [unfilled]. [Please see my note below.] : Please see my note below. [Consult Closing:] : Thank you very much for allowing me to participate in the care of this patient.  If you have any questions, please do not hesitate to contact me. [Sincerely,] : Sincerely, [FreeTextEntry2] : Dr. Jennifer Wheeler [FreeTextEntry3] : Kyle Angelo M.D. Division of Laryngology | Department of Otolaryngology [address] [id number]